# Patient Record
Sex: MALE | Race: WHITE | NOT HISPANIC OR LATINO | Employment: FULL TIME | URBAN - METROPOLITAN AREA
[De-identification: names, ages, dates, MRNs, and addresses within clinical notes are randomized per-mention and may not be internally consistent; named-entity substitution may affect disease eponyms.]

---

## 2017-03-13 ENCOUNTER — ALLSCRIPTS OFFICE VISIT (OUTPATIENT)
Dept: OTHER | Facility: OTHER | Age: 52
End: 2017-03-13

## 2017-03-13 LAB
BILIRUB UR QL STRIP: NEGATIVE
CLARITY UR: NORMAL
COLOR UR: YELLOW
GLUCOSE (HISTORICAL): >1000
HBA1C MFR BLD HPLC: 7.2 %
HGB UR QL STRIP.AUTO: NEGATIVE
KETONES UR STRIP-MCNC: NEGATIVE MG/DL
LEUKOCYTE ESTERASE UR QL STRIP: NEGATIVE
NITRITE UR QL STRIP: NEGATIVE
PH UR STRIP.AUTO: 6 [PH]
PROT UR STRIP-MCNC: NEGATIVE MG/DL
SP GR UR STRIP.AUTO: 1.01
UROBILINOGEN UR QL STRIP.AUTO: NORMAL

## 2017-06-12 ENCOUNTER — ALLSCRIPTS OFFICE VISIT (OUTPATIENT)
Dept: OTHER | Facility: OTHER | Age: 52
End: 2017-06-12

## 2017-06-12 LAB — HBA1C MFR BLD HPLC: 7.4 %

## 2017-07-11 ENCOUNTER — GENERIC CONVERSION - ENCOUNTER (OUTPATIENT)
Dept: OTHER | Facility: OTHER | Age: 52
End: 2017-07-11

## 2017-07-17 ENCOUNTER — GENERIC CONVERSION - ENCOUNTER (OUTPATIENT)
Dept: OTHER | Facility: OTHER | Age: 52
End: 2017-07-17

## 2017-09-11 ENCOUNTER — ALLSCRIPTS OFFICE VISIT (OUTPATIENT)
Dept: OTHER | Facility: OTHER | Age: 52
End: 2017-09-11

## 2017-09-11 LAB — HBA1C MFR BLD HPLC: 9.1 %

## 2017-10-03 ENCOUNTER — GENERIC CONVERSION - ENCOUNTER (OUTPATIENT)
Dept: OTHER | Facility: OTHER | Age: 52
End: 2017-10-03

## 2018-01-10 NOTE — RESULT NOTES
Message   PLEASE HOLD OV 12/12     Verified Results  (1) LIPID PANEL, FASTING [id number] 07:04AM [facility]     Test Name Result Flag Reference   CHOLESTEROL, TOTAL 162 mg/dL  125-200   HDL CHOLESTEROL 43 mg/dL  > OR = 40   TRIGLICERIDES 962 mg/dL H <150   LDL-CHOLESTEROL 79 mg/dL (calc)  <130   Desirable range <100 mg/dL for patients with CHD or  diabetes and <70 mg/dL for diabetic patients with  known heart disease  CHOL/HDLC RATIO 3 8 (calc)  < OR = 5 0   NON HDL CHOLESTEROL 119 mg/dL (calc)     Target for non-HDL cholesterol is 30 mg/dL higher than   LDL cholesterol target  (1) COMPREHENSIVE METABOLIC PANEL [id number] 13:44HA [facility]     Test Name Result Flag Reference   GLUCOSE 141 mg/dL H 65-99   Fasting reference interval   UREA NITROGEN (BUN) 16 mg/dL  7-25   CREATININE 0 94 mg/dL  0 70-1 33   For patients >52years of age, the reference limit  for Creatinine is approximately 13% higher for people  identified as -American  eGFR NON-AFR   AMERICAN 93 mL/min/1 73m2  > OR = 60   eGFR AFRICAN AMERICAN 108 mL/min/1 73m2  > OR = 60   BUN/CREATININE RATIO   5-80   NOT APPLICABLE (calc)   SODIUM 139 mmol/L  135-146   POTASSIUM 4 8 mmol/L  3 5-5 3   CHLORIDE 103 mmol/L     CARBON DIOXIDE 27 mmol/L  20-31   CALCIUM 9 5 mg/dL  8 6-10 3   PROTEIN, TOTAL 7 0 g/dL  6 1-8 1   ALBUMIN 4 7 g/dL  3 6-5 1   GLOBULIN 2 3 g/dL (calc)  1 9-3 7   ALBUMIN/GLOBULIN RATIO 2 0 (calc)  1 0-2 5   BILIRUBIN, TOTAL 0 6 mg/dL  0 2-1 2   ALKALINE PHOSPHATASE 76 U/L     AST 16 U/L  10-35   ALT 28 U/L  9-46     (1) URINALYSIS (will reflex a microscopy if leukocytes, occult blood, protein or nitrites are not within normal limits) [id number] 07:04AM [facility]     Test Name Result Flag Reference   COLOR YELLOW  YELLOW   APPEARANCE CLEAR  CLEAR   SPECIFIC GRAVITY 1 025  1 001-1 035   PH 6 5  5 0-8 0   GLUCOSE 3+ A NEGATIVE   BILIRUBIN NEGATIVE  NEGATIVE   KETONES NEGATIVE  NEGATIVE   OCCULT BLOOD NEGATIVE NEGATIVE   PROTEIN NEGATIVE  NEGATIVE   NITRITE NEGATIVE  NEGATIVE   LEUKOCYTE ESTERASE NEGATIVE  NEGATIVE   WBC 0-5 /HPF  < OR = 5   RBC NONE SEEN /HPF  < OR = 2   SQUAMOUS EPITHELIAL CELLS NONE SEEN /HPF  < OR = 5   BACTERIA NONE SEEN /HPF  NONE SEEN   HYALINE CAST NONE SEEN /LPF  NONE SEEN   COMMENTS      Spermatozoa present     (Q) HEMOGLOBIN A1c [id number] 07:04AM [facility]   REPORT COMMENT:  FASTING:YES     Test Name Result Flag Reference   HEMOGLOBIN A1c 7 9 % of total Hgb H <5 7   According to ADA guidelines, hemoglobin A1c <7 0%  represents optimal control in non-pregnant diabetic  patients  Different metrics may apply to specific  patient populations  Standards of Medical Care in    Diabetes Care  2013;36:s11-s66     For the purpose of screening for the presence of  diabetes  <5 7%       Consistent with the absence of diabetes  5 7-6 4%    Consistent with increased risk for diabetes              (prediabetes)  >or=6 5%    Consistent with diabetes     This assay result is consistent with diabetes  mellitus  Currently, no consensus exists for use of hemoglobin  A1c for diagnosis of diabetes for children  (Q) MICROALBUMIN, RANDOM URINE (W/CREATININE) [id number] 07:04AM [facility]     Test Name Result Flag Reference   CREATININE, RANDOM URINE 75 mg/dL     MICROALBUMIN 1 4 mg/dL     Reference Range  Not established   MICROALBUMIN/CREATININE$RATIO, RANDOM URINE 19 mcg/mg creat  <30   The ADA defines abnormalities in albumin  excretion as follows:     Category         Result (mcg/mg creatinine)     Normal                    <30  Microalbuminuria            Clinical albuminuria   > OR = 300     The ADA recommends that at least two of three  specimens collected within a 3-6 month period be  abnormal before considering a patient to be  within a diagnostic category

## 2018-01-12 VITALS
HEART RATE: 70 BPM | WEIGHT: 196 LBS | HEIGHT: 71 IN | SYSTOLIC BLOOD PRESSURE: 122 MMHG | BODY MASS INDEX: 27.44 KG/M2 | DIASTOLIC BLOOD PRESSURE: 78 MMHG | RESPIRATION RATE: 16 BRPM | TEMPERATURE: 97.7 F

## 2018-01-14 VITALS
WEIGHT: 200 LBS | OXYGEN SATURATION: 96 % | HEART RATE: 80 BPM | DIASTOLIC BLOOD PRESSURE: 70 MMHG | SYSTOLIC BLOOD PRESSURE: 118 MMHG | TEMPERATURE: 97.7 F | HEIGHT: 72 IN | BODY MASS INDEX: 27.09 KG/M2 | RESPIRATION RATE: 16 BRPM

## 2018-01-14 VITALS
HEART RATE: 88 BPM | WEIGHT: 190 LBS | RESPIRATION RATE: 16 BRPM | TEMPERATURE: 98.2 F | BODY MASS INDEX: 26.6 KG/M2 | DIASTOLIC BLOOD PRESSURE: 86 MMHG | SYSTOLIC BLOOD PRESSURE: 134 MMHG | HEIGHT: 71 IN

## 2018-01-16 NOTE — RESULT NOTES
Message   please hold on chart for ov next week   thanks     Verified Results  (1) COMPREHENSIVE METABOLIC PANEL [id number] 67:21BQ [facility]     Test Name Result Flag Reference   GLUCOSE 174 mg/dL H 65-99   Fasting reference interval   UREA NITROGEN (BUN) 19 mg/dL  7-25   CREATININE 1 00 mg/dL  0 70-1 33   For patients >52years of age, the reference limit  for Creatinine is approximately 13% higher for people  identified as -American  eGFR NON-AFR  AMERICAN 87 mL/min/1 73m2  > OR = 60   eGFR AFRICAN AMERICAN 101 mL/min/1 73m2  > OR = 60   BUN/CREATININE RATIO   6-52   NOT APPLICABLE (calc)   SODIUM 138 mmol/L  135-146   POTASSIUM 4 0 mmol/L  3 5-5 3   CHLORIDE 101 mmol/L     CARBON DIOXIDE 24 mmol/L  20-31   CALCIUM 9 8 mg/dL  8 6-10 3   PROTEIN, TOTAL 7 2 g/dL  6 1-8 1   ALBUMIN 4 7 g/dL  3 6-5 1   GLOBULIN 2 5 g/dL (calc)  1 9-3 7   ALBUMIN/GLOBULIN RATIO 1 9 (calc)  1 0-2 5   BILIRUBIN, TOTAL 0 5 mg/dL  0 2-1 2   ALKALINE PHOSPHATASE 73 U/L     AST 19 U/L  10-35   ALT 32 U/L  9-46     (1) LIPID PANEL, FASTING [id number] 07:01AM [facility]     Test Name Result Flag Reference   CHOLESTEROL, TOTAL 166 mg/dL  125-200   HDL CHOLESTEROL 40 mg/dL  > OR = 40   TRIGLICERIDES 873 mg/dL H <150   LDL-CHOLESTEROL 67 mg/dL (calc)  <130   Desirable range <100 mg/dL for patients with CHD or  diabetes and <70 mg/dL for diabetic patients with  known heart disease  CHOL/HDLC RATIO 4 2 (calc)  < OR = 5 0   NON HDL CHOLESTEROL 126 mg/dL (calc)     Target for non-HDL cholesterol is 30 mg/dL higher than   LDL cholesterol target  (Q) HEMOGLOBIN A1c [id number] 07:01AM [facility]   REPORT COMMENT:  FASTING:YES     Test Name Result Flag Reference   HEMOGLOBIN A1c 8 0 % of total Hgb H <5 7   According to ADA guidelines, hemoglobin A1c <7 0%  represents optimal control in non-pregnant diabetic  patients  Different metrics may apply to specific  patient populations   Standards of Medical Care in    Diabetes Care  2013;36:s11-s66     For the purpose of screening for the presence of  diabetes  <5 7%       Consistent with the absence of diabetes  5 7-6 4%    Consistent with increased risk for diabetes              (prediabetes)  >or=6 5%    Consistent with diabetes     This assay result is consistent with diabetes  mellitus  Currently, no consensus exists for use of hemoglobin  A1c for diagnosis of diabetes for children

## 2018-07-17 LAB
LEFT EYE DIABETIC RETINOPATHY: NORMAL
RIGHT EYE DIABETIC RETINOPATHY: NORMAL

## 2018-07-17 PROCEDURE — 3072F LOW RISK FOR RETINOPATHY: CPT | Performed by: FAMILY MEDICINE

## 2018-08-02 ENCOUNTER — OFFICE VISIT (OUTPATIENT)
Dept: FAMILY MEDICINE CLINIC | Facility: CLINIC | Age: 53
End: 2018-08-02
Payer: COMMERCIAL

## 2018-08-02 VITALS
BODY MASS INDEX: 27.36 KG/M2 | HEART RATE: 56 BPM | TEMPERATURE: 97.5 F | HEIGHT: 72 IN | SYSTOLIC BLOOD PRESSURE: 130 MMHG | RESPIRATION RATE: 16 BRPM | DIASTOLIC BLOOD PRESSURE: 80 MMHG | OXYGEN SATURATION: 96 % | WEIGHT: 202 LBS

## 2018-08-02 DIAGNOSIS — H60.501 ACUTE OTITIS EXTERNA OF RIGHT EAR, UNSPECIFIED TYPE: ICD-10-CM

## 2018-08-02 DIAGNOSIS — H61.21 IMPACTED CERUMEN OF RIGHT EAR: ICD-10-CM

## 2018-08-02 DIAGNOSIS — J01.40 ACUTE NON-RECURRENT PANSINUSITIS: Primary | ICD-10-CM

## 2018-08-02 PROBLEM — E11.9 TYPE 2 DIABETES MELLITUS WITHOUT COMPLICATION (HCC): Status: ACTIVE | Noted: 2018-08-02

## 2018-08-02 PROBLEM — H90.3 BILATERAL SENSORINEURAL HEARING LOSS: Status: ACTIVE | Noted: 2018-08-02

## 2018-08-02 PROBLEM — G43.909 MIGRAINE: Status: ACTIVE | Noted: 2018-08-02

## 2018-08-02 PROCEDURE — 99213 OFFICE O/P EST LOW 20 MIN: CPT | Performed by: FAMILY MEDICINE

## 2018-08-02 PROCEDURE — 3008F BODY MASS INDEX DOCD: CPT | Performed by: FAMILY MEDICINE

## 2018-08-02 PROCEDURE — 69209 REMOVE IMPACTED EAR WAX UNI: CPT | Performed by: FAMILY MEDICINE

## 2018-08-02 PROCEDURE — 1036F TOBACCO NON-USER: CPT | Performed by: FAMILY MEDICINE

## 2018-08-02 RX ORDER — TADALAFIL 20 MG/1
1 TABLET ORAL
COMMUNITY
Start: 2015-12-07 | End: 2020-03-05 | Stop reason: ALTCHOICE

## 2018-08-02 RX ORDER — RIZATRIPTAN BENZOATE 10 MG/1
TABLET, ORALLY DISINTEGRATING ORAL
COMMUNITY
End: 2020-03-05 | Stop reason: ALTCHOICE

## 2018-08-02 RX ORDER — NEOMYCIN SULFATE, POLYMYXIN B SULFATE AND HYDROCORTISONE 10; 3.5; 1 MG/ML; MG/ML; [USP'U]/ML
4 SUSPENSION/ DROPS AURICULAR (OTIC) 4 TIMES DAILY
Qty: 4 ML | Refills: 0 | Status: SHIPPED | OUTPATIENT
Start: 2018-08-02 | End: 2019-06-26

## 2018-08-02 RX ORDER — VENLAFAXINE 75 MG/1
1 TABLET ORAL DAILY
COMMUNITY
End: 2018-08-02 | Stop reason: ALTCHOICE

## 2018-08-02 RX ORDER — AMOXICILLIN AND CLAVULANATE POTASSIUM 500; 125 MG/1; MG/1
1 TABLET, FILM COATED ORAL EVERY 12 HOURS SCHEDULED
Qty: 20 TABLET | Refills: 0 | Status: SHIPPED | OUTPATIENT
Start: 2018-08-02 | End: 2018-08-12

## 2018-08-02 RX ORDER — AMITRIPTYLINE HYDROCHLORIDE 50 MG/1
TABLET, FILM COATED ORAL
COMMUNITY
End: 2018-08-02 | Stop reason: ALTCHOICE

## 2018-08-02 RX ORDER — ATORVASTATIN CALCIUM 10 MG/1
10 TABLET, FILM COATED ORAL DAILY
COMMUNITY

## 2018-08-02 RX ORDER — PROPRANOLOL HCL 60 MG
CAPSULE, EXTENDED RELEASE 24HR ORAL DAILY
COMMUNITY
End: 2020-03-05 | Stop reason: ALTCHOICE

## 2018-08-02 RX ORDER — FLUCONAZOLE 150 MG/1
TABLET ORAL
COMMUNITY
End: 2018-08-02 | Stop reason: ALTCHOICE

## 2018-08-02 RX ORDER — AMITRIPTYLINE HYDROCHLORIDE 25 MG/1
TABLET, FILM COATED ORAL DAILY
COMMUNITY
End: 2020-03-05 | Stop reason: ALTCHOICE

## 2018-08-02 NOTE — PROGRESS NOTES
Assessment/Plan:    Problem List Items Addressed This Visit        Respiratory    Acute non-recurrent pansinusitis - Primary    Relevant Medications    amoxicillin-clavulanate (AUGMENTIN) 500-125 mg per tablet       Nervous and Auditory    Impacted cerumen of right ear    Acute otitis externa of right ear    Relevant Medications    neomycin-polymyxin-hydrocortisone (CORTISPORIN) 0 35%-10,000 units/mL-1% otic suspension          Patient Instructions   PLENTY FLUIDS  REST  MUCINEX  MEDICATION AS DIRECTED  IF SYMPTOMS PERSIST OR WORSEN, PLEASE CALL      FLONASE  2 SPRAYS EACH NOSTRIL DAILY  CERUMENEX OR DEBROX  1 DROPPER FULL EACH EAR WEEKLY BEFORE SHOWER  KEEP EARS DRY        Return in about 2 weeks (around 8/16/2018), or if symptoms worsen or fail to improve  Subjective:      Patient ID: Kianna Brownlee is a 48 y o  male  Chief Complaint   Patient presents with    right ear pain     x 1 month       PATIENT COMPLAINS OF CONGESTION  R EAR FEELS CLOGGED AND HURTS  X SEVERAL WEEKS  DENIES ANY FEVER OR CHILLS  NO NVD  NO COUGH        The following portions of the patient's history were reviewed and updated as appropriate: allergies, current medications, past family history, past medical history, past social history, past surgical history and problem list     Review of Systems   Constitutional: Negative for chills  HENT: Positive for congestion, ear pain, sinus pressure and sore throat  Negative for sneezing  Eyes: Negative for discharge  Respiratory: Negative for cough and shortness of breath  Cardiovascular: Negative for chest pain  Gastrointestinal: Negative for abdominal pain, diarrhea, nausea and vomiting  Genitourinary: Negative for dysuria  Musculoskeletal: Negative for arthralgias, myalgias and neck pain  Neurological: Positive for headaches  Hematological: Negative for adenopathy  Psychiatric/Behavioral: The patient is not nervous/anxious            Current Outpatient Prescriptions Medication Sig Dispense Refill    amitriptyline (ELAVIL) 25 mg tablet Daily      atorvastatin (LIPITOR) 10 mg tablet atorvastatin 10 mg tablet      Empagliflozin-Metformin HCl (SYNJARDY) 12 5-1000 MG TABS Synjardy 12 5 mg-1,000 mg tablet      Exenatide ER (BYDUREON BCISE) 2 MG/0 85ML AUIJ Bydureon BCise 2 mg/0 85 mL subcutaneous auto-injector      glucose blood (ONETOUCH VERIO) test strip by In Vitro route      propranolol (INDERAL LA) 60 mg 24 hr capsule Daily      rizatriptan (MAXALT-MLT) 10 MG disintegrating tablet rizatriptan 10 mg disintegrating tablet      tadalafil (CIALIS) 20 MG tablet Take 1 tablet by mouth      amoxicillin-clavulanate (AUGMENTIN) 500-125 mg per tablet Take 1 tablet by mouth every 12 (twelve) hours for 10 days 20 tablet 0    neomycin-polymyxin-hydrocortisone (CORTISPORIN) 0 35%-10,000 units/mL-1% otic suspension Administer 4 drops to the right ear 4 (four) times a day for 5 days 4 mL 0     No current facility-administered medications for this visit  Objective:    /80   Pulse 56   Temp 97 5 °F (36 4 °C) (Temporal)   Resp 16   Ht 6' (1 829 m)   Wt 91 6 kg (202 lb)   SpO2 96%   BMI 27 40 kg/m²        Physical Exam   Constitutional: He is oriented to person, place, and time  He appears well-developed and well-nourished  HENT:   Head: Normocephalic and atraumatic  Right Ear: No drainage  Tympanic membrane is not erythematous and not bulging  A middle ear effusion is present  Left Ear: No drainage  Tympanic membrane is not erythematous and not bulging  A middle ear effusion is present  Nose: Mucosal edema and rhinorrhea present  Mouth/Throat: Uvula is midline  Posterior oropharyngeal erythema present  No oropharyngeal exudate  R EAR CANAL OCCLUDED WITH CERUMEN  IRRIGATED TILL CLEAR  TM DULL  R CANAL MILDLY ERYTHEMATOUS   Eyes: Pupils are equal, round, and reactive to light  Right eye exhibits no discharge  Left eye exhibits no discharge     Neck: Normal range of motion  Neck supple  Cardiovascular: Normal rate, regular rhythm and normal heart sounds  No murmur heard  Pulmonary/Chest: Effort normal and breath sounds normal  He has no wheezes  He has no rales  Abdominal: Soft  Bowel sounds are normal  There is no tenderness  Musculoskeletal: Normal range of motion  He exhibits no edema  Lymphadenopathy:     He has no cervical adenopathy  Neurological: He is alert and oriented to person, place, and time  Skin: Skin is warm and dry  No rash noted  Psychiatric: He has a normal mood and affect  His behavior is normal  Judgment and thought content normal    Nursing note and vitals reviewed               Cary Atkins MD

## 2018-08-02 NOTE — PROGRESS NOTES
Ear cerumen removal  Date/Time: 8/2/2018 1:53 PM  Performed by: Melody Navarro by: Martha Braun     Patient location:  Clinic  Other Assisting Provider: No    Consent:     Consent obtained:  Verbal    Consent given by:  Patient    Risks discussed:  Bleeding, dizziness, infection, incomplete removal, TM perforation and pain    Alternatives discussed:  No treatment  Procedure details:     Location:  R ear    Procedure type: irrigation      Approach:  External  Post-procedure details:     Complication:  None    Hearing quality:  Improved    Patient tolerance of procedure:   Tolerated well, no immediate complications

## 2018-08-02 NOTE — PATIENT INSTRUCTIONS
PLENTY FLUIDS  REST  MUCINEX  MEDICATION AS DIRECTED  IF SYMPTOMS PERSIST OR WORSEN, PLEASE CALL      FLONASE  2 SPRAYS EACH NOSTRIL DAILY  CERUMENEX OR DEBROX  1 DROPPER FULL EACH EAR WEEKLY BEFORE SHOWER  KEEP EARS DRY

## 2018-08-03 ENCOUNTER — TELEPHONE (OUTPATIENT)
Dept: FAMILY MEDICINE CLINIC | Facility: CLINIC | Age: 53
End: 2018-08-03

## 2018-08-03 DIAGNOSIS — J01.40 ACUTE NON-RECURRENT PANSINUSITIS: Primary | ICD-10-CM

## 2018-08-03 RX ORDER — CIPROFLOXACIN 250 MG/1
250 TABLET, FILM COATED ORAL EVERY 12 HOURS SCHEDULED
Qty: 20 TABLET | Refills: 0 | Status: SHIPPED | OUTPATIENT
Start: 2018-08-03 | End: 2018-08-13

## 2019-05-08 LAB
CREAT ?TM UR-SCNC: 30 UMOL/L
EXT MICROALBUMIN URINE RANDOM: 0.4
HBA1C MFR BLD HPLC: 8.1 %
MICROALBUMIN/CREAT UR: 15 MG/G{CREAT}

## 2019-05-08 PROCEDURE — 3061F NEG MICROALBUMINURIA REV: CPT | Performed by: FAMILY MEDICINE

## 2019-06-26 ENCOUNTER — OFFICE VISIT (OUTPATIENT)
Dept: FAMILY MEDICINE CLINIC | Facility: CLINIC | Age: 54
End: 2019-06-26
Payer: COMMERCIAL

## 2019-06-26 VITALS
OXYGEN SATURATION: 98 % | BODY MASS INDEX: 27.77 KG/M2 | HEIGHT: 72 IN | HEART RATE: 89 BPM | RESPIRATION RATE: 14 BRPM | WEIGHT: 205 LBS | DIASTOLIC BLOOD PRESSURE: 90 MMHG | TEMPERATURE: 97.8 F | SYSTOLIC BLOOD PRESSURE: 140 MMHG

## 2019-06-26 DIAGNOSIS — R51.9 CHRONIC NONINTRACTABLE HEADACHE, UNSPECIFIED HEADACHE TYPE: ICD-10-CM

## 2019-06-26 DIAGNOSIS — G89.29 CHRONIC NONINTRACTABLE HEADACHE, UNSPECIFIED HEADACHE TYPE: ICD-10-CM

## 2019-06-26 DIAGNOSIS — M48.02 SPINAL STENOSIS IN CERVICAL REGION: ICD-10-CM

## 2019-06-26 DIAGNOSIS — H92.01 RIGHT EAR PAIN: ICD-10-CM

## 2019-06-26 DIAGNOSIS — G62.9 NEUROPATHY: Primary | ICD-10-CM

## 2019-06-26 DIAGNOSIS — E11.42 TYPE 2 DIABETES MELLITUS WITH DIABETIC POLYNEUROPATHY, WITHOUT LONG-TERM CURRENT USE OF INSULIN (HCC): ICD-10-CM

## 2019-06-26 DIAGNOSIS — R53.83 FATIGUE, UNSPECIFIED TYPE: ICD-10-CM

## 2019-06-26 PROBLEM — H61.21 IMPACTED CERUMEN OF RIGHT EAR: Status: RESOLVED | Noted: 2018-08-02 | Resolved: 2019-06-26

## 2019-06-26 PROBLEM — H60.501 ACUTE OTITIS EXTERNA OF RIGHT EAR: Status: RESOLVED | Noted: 2018-08-02 | Resolved: 2019-06-26

## 2019-06-26 PROBLEM — J01.40 ACUTE NON-RECURRENT PANSINUSITIS: Status: RESOLVED | Noted: 2018-08-02 | Resolved: 2019-06-26

## 2019-06-26 LAB — SL AMB POCT HEMOGLOBIN AIC: 7.9 (ref ?–6.5)

## 2019-06-26 PROCEDURE — 3008F BODY MASS INDEX DOCD: CPT | Performed by: FAMILY MEDICINE

## 2019-06-26 PROCEDURE — 36415 COLL VENOUS BLD VENIPUNCTURE: CPT | Performed by: FAMILY MEDICINE

## 2019-06-26 PROCEDURE — 1036F TOBACCO NON-USER: CPT | Performed by: FAMILY MEDICINE

## 2019-06-26 PROCEDURE — 83036 HEMOGLOBIN GLYCOSYLATED A1C: CPT | Performed by: FAMILY MEDICINE

## 2019-06-26 PROCEDURE — 3045F PR MOST RECENT HEMOGLOBIN A1C LEVEL 7.0-9.0%: CPT | Performed by: FAMILY MEDICINE

## 2019-06-26 PROCEDURE — 99214 OFFICE O/P EST MOD 30 MIN: CPT | Performed by: FAMILY MEDICINE

## 2019-06-27 ENCOUNTER — TELEPHONE (OUTPATIENT)
Dept: FAMILY MEDICINE CLINIC | Facility: CLINIC | Age: 54
End: 2019-06-27

## 2019-06-27 LAB
ALBUMIN SERPL-MCNC: 4.9 G/DL (ref 3.5–5.5)
ALBUMIN/CREAT UR: <11.5 MG/G CREAT (ref 0–30)
ALBUMIN/GLOB SERPL: 1.8 {RATIO} (ref 1.2–2.2)
ALP SERPL-CCNC: 72 IU/L (ref 39–117)
ALT SERPL-CCNC: 30 IU/L (ref 0–44)
AST SERPL-CCNC: 19 IU/L (ref 0–40)
B BURGDOR IGG PATRN SER IB-IMP: NEGATIVE
B BURGDOR IGM PATRN SER IB-IMP: NEGATIVE
B BURGDOR18KD IGG SER QL IB: PRESENT
B BURGDOR23KD IGG SER QL IB: ABNORMAL
B BURGDOR23KD IGM SER QL IB: ABNORMAL
B BURGDOR28KD IGG SER QL IB: PRESENT
B BURGDOR30KD IGG SER QL IB: ABNORMAL
B BURGDOR39KD IGG SER QL IB: ABNORMAL
B BURGDOR39KD IGM SER QL IB: ABNORMAL
B BURGDOR41KD IGG SER QL IB: ABNORMAL
B BURGDOR41KD IGM SER QL IB: ABNORMAL
B BURGDOR45KD IGG SER QL IB: ABNORMAL
B BURGDOR58KD IGG SER QL IB: ABNORMAL
B BURGDOR66KD IGG SER QL IB: ABNORMAL
B BURGDOR93KD IGG SER QL IB: ABNORMAL
BASOPHILS # BLD AUTO: 0.1 X10E3/UL (ref 0–0.2)
BASOPHILS NFR BLD AUTO: 1 %
BILIRUB SERPL-MCNC: 0.5 MG/DL (ref 0–1.2)
BUN SERPL-MCNC: 14 MG/DL (ref 6–24)
BUN/CREAT SERPL: 14 (ref 9–20)
CALCIUM SERPL-MCNC: 10.4 MG/DL (ref 8.7–10.2)
CENTROMERE B AB SER-ACNC: <0.2 AI (ref 0–0.9)
CHLORIDE SERPL-SCNC: 102 MMOL/L (ref 96–106)
CHOLEST SERPL-MCNC: 194 MG/DL (ref 100–199)
CHOLEST/HDLC SERPL: 4.4 RATIO (ref 0–5)
CHROMATIN AB SERPL-ACNC: <0.2 AI (ref 0–0.9)
CO2 SERPL-SCNC: 22 MMOL/L (ref 20–29)
CREAT SERPL-MCNC: 0.99 MG/DL (ref 0.76–1.27)
CREAT UR-MCNC: 26.1 MG/DL
CRP SERPL-MCNC: <1 MG/L (ref 0–10)
DSDNA AB SER-ACNC: <1 IU/ML (ref 0–9)
ENA JO1 AB SER-ACNC: <0.2 AI (ref 0–0.9)
ENA RNP AB SER-ACNC: <0.2 AI (ref 0–0.9)
ENA SCL70 AB SER-ACNC: <0.2 AI (ref 0–0.9)
ENA SM AB SER-ACNC: <0.2 AI (ref 0–0.9)
ENA SS-A AB SER-ACNC: <0.2 AI (ref 0–0.9)
ENA SS-B AB SER-ACNC: <0.2 AI (ref 0–0.9)
EOSINOPHIL # BLD AUTO: 0.2 X10E3/UL (ref 0–0.4)
EOSINOPHIL NFR BLD AUTO: 2 %
ERYTHROCYTE [DISTWIDTH] IN BLOOD BY AUTOMATED COUNT: 13.6 % (ref 12.3–15.4)
ERYTHROCYTE [SEDIMENTATION RATE] IN BLOOD BY WESTERGREN METHOD: 21 MM/HR (ref 0–30)
EST. AVERAGE GLUCOSE BLD GHB EST-MCNC: 200 MG/DL
GLOBULIN SER-MCNC: 2.7 G/DL (ref 1.5–4.5)
GLUCOSE SERPL-MCNC: 186 MG/DL (ref 65–99)
HBA1C MFR BLD: 8.6 % (ref 4.8–5.6)
HCT VFR BLD AUTO: 50.1 % (ref 37.5–51)
HDLC SERPL-MCNC: 44 MG/DL
HGB BLD-MCNC: 16.9 G/DL (ref 13–17.7)
IMM GRANULOCYTES # BLD: 0 X10E3/UL (ref 0–0.1)
IMM GRANULOCYTES NFR BLD: 0 %
LDLC SERPL CALC-MCNC: 101 MG/DL (ref 0–99)
LYMPHOCYTES # BLD AUTO: 2.8 X10E3/UL (ref 0.7–3.1)
LYMPHOCYTES NFR BLD AUTO: 37 %
MCH RBC QN AUTO: 30.4 PG (ref 26.6–33)
MCHC RBC AUTO-ENTMCNC: 33.7 G/DL (ref 31.5–35.7)
MCV RBC AUTO: 90 FL (ref 79–97)
MICROALBUMIN UR-MCNC: <3 UG/ML
MONOCYTES # BLD AUTO: 0.5 X10E3/UL (ref 0.1–0.9)
MONOCYTES NFR BLD AUTO: 7 %
NEUTROPHILS # BLD AUTO: 3.9 X10E3/UL (ref 1.4–7)
NEUTROPHILS NFR BLD AUTO: 53 %
PLATELET # BLD AUTO: 227 X10E3/UL (ref 150–450)
POTASSIUM SERPL-SCNC: 4.6 MMOL/L (ref 3.5–5.2)
PROT SERPL-MCNC: 7.6 G/DL (ref 6–8.5)
RBC # BLD AUTO: 5.56 X10E6/UL (ref 4.14–5.8)
SL AMB EGFR AFRICAN AMERICAN: 99 ML/MIN/1.73
SL AMB EGFR NON AFRICAN AMERICAN: 86 ML/MIN/1.73
SL AMB SEE BELOW:: NORMAL
SL AMB VLDL CHOLESTEROL CALC: 49 MG/DL (ref 5–40)
SODIUM SERPL-SCNC: 140 MMOL/L (ref 134–144)
TRIGL SERPL-MCNC: 247 MG/DL (ref 0–149)
TSH SERPL DL<=0.005 MIU/L-ACNC: 1.93 UIU/ML (ref 0.45–4.5)
WBC # BLD AUTO: 7.4 X10E3/UL (ref 3.4–10.8)

## 2019-06-27 PROCEDURE — 3045F PR MOST RECENT HEMOGLOBIN A1C LEVEL 7.0-9.0%: CPT | Performed by: FAMILY MEDICINE

## 2019-06-28 ENCOUNTER — TELEPHONE (OUTPATIENT)
Dept: FAMILY MEDICINE CLINIC | Facility: CLINIC | Age: 54
End: 2019-06-28

## 2019-07-10 NOTE — TELEPHONE ENCOUNTER
Jilda Nageotte called an stated that he received a phone call from the scheduling department that his MRI was approved and scheduled his appointment  I informed Dr Casimiro Ferreira that it was denied  He wanted Levi Bonilla to call Jilda Nageotte to let him know and that if he wanted to try to see a neurologist and have them order it he can do the referral     I verified again with the insurance company and it was denied    Spoke to Jilda Nageotte and he will let us know about seeing a neurologist

## 2019-09-05 DIAGNOSIS — B37.0 THRUSH: Primary | ICD-10-CM

## 2020-02-26 LAB
CREAT ?TM UR-SCNC: 29 UMOL/L
CREAT ?TM UR-SCNC: 29 UMOL/L
EXT MICROALBUMIN URINE RANDOM: 0.3
EXT MICROALBUMIN URINE RANDOM: 0.3
HBA1C MFR BLD HPLC: 7.5 %
MICROALBUMIN/CREAT UR: 10 MG/G{CREAT}
MICROALBUMIN/CREAT UR: 10 MG/G{CREAT}

## 2020-02-26 PROCEDURE — 3051F HG A1C>EQUAL 7.0%<8.0%: CPT | Performed by: FAMILY MEDICINE

## 2020-03-05 ENCOUNTER — OFFICE VISIT (OUTPATIENT)
Dept: FAMILY MEDICINE CLINIC | Facility: CLINIC | Age: 55
End: 2020-03-05
Payer: COMMERCIAL

## 2020-03-05 VITALS
SYSTOLIC BLOOD PRESSURE: 136 MMHG | TEMPERATURE: 98.2 F | DIASTOLIC BLOOD PRESSURE: 90 MMHG | HEIGHT: 72 IN | RESPIRATION RATE: 16 BRPM | WEIGHT: 206 LBS | HEART RATE: 90 BPM | OXYGEN SATURATION: 96 % | BODY MASS INDEX: 27.9 KG/M2

## 2020-03-05 DIAGNOSIS — E11.42 TYPE 2 DIABETES MELLITUS WITH DIABETIC POLYNEUROPATHY, WITHOUT LONG-TERM CURRENT USE OF INSULIN (HCC): ICD-10-CM

## 2020-03-05 DIAGNOSIS — H57.89 EYE SWELLING, RIGHT: Primary | ICD-10-CM

## 2020-03-05 DIAGNOSIS — H01.001 BLEPHARITIS OF RIGHT UPPER EYELID, UNSPECIFIED TYPE: ICD-10-CM

## 2020-03-05 PROBLEM — H92.01 RIGHT EAR PAIN: Status: RESOLVED | Noted: 2019-06-26 | Resolved: 2020-03-05

## 2020-03-05 PROBLEM — R53.83 FATIGUE: Status: RESOLVED | Noted: 2019-06-26 | Resolved: 2020-03-05

## 2020-03-05 PROCEDURE — 1036F TOBACCO NON-USER: CPT | Performed by: FAMILY MEDICINE

## 2020-03-05 PROCEDURE — 99213 OFFICE O/P EST LOW 20 MIN: CPT | Performed by: FAMILY MEDICINE

## 2020-03-05 PROCEDURE — 3008F BODY MASS INDEX DOCD: CPT | Performed by: FAMILY MEDICINE

## 2020-03-05 RX ORDER — IPRATROPIUM BROMIDE 42 UG/1
SPRAY, METERED NASAL
COMMUNITY
Start: 2020-02-26 | End: 2021-05-12 | Stop reason: HOSPADM

## 2020-03-05 RX ORDER — TOBRAMYCIN AND DEXAMETHASONE 3; 1 MG/ML; MG/ML
2 SUSPENSION/ DROPS OPHTHALMIC 4 TIMES DAILY
Qty: 2.5 ML | Refills: 0 | Status: SHIPPED | OUTPATIENT
Start: 2020-03-05 | End: 2020-03-10

## 2020-03-05 NOTE — PROGRESS NOTES
BMI Counseling: Body mass index is 27 94 kg/m²  The BMI is above normal  Nutrition recommendations include encouraging healthy choices of fruits and vegetables and moderation in carbohydrate intake  Exercise recommendations include exercising 3-5 times per week  No pharmacotherapy was ordered  Assessment/Plan:    No problem-specific Assessment & Plan notes found for this encounter  Diagnoses and all orders for this visit:    Eye swelling, right  -     tobramycin-dexamethasone (TOBRADEX) ophthalmic suspension; Administer 2 drops to both eyes 4 (four) times a day for 5 days    Type 2 diabetes mellitus with diabetic polyneuropathy, without long-term current use of insulin (HCC)  -     tobramycin-dexamethasone (TOBRADEX) ophthalmic suspension; Administer 2 drops to both eyes 4 (four) times a day for 5 days    Blepharitis of right upper eyelid, unspecified type  -     tobramycin-dexamethasone (TOBRADEX) ophthalmic suspension; Administer 2 drops to both eyes 4 (four) times a day for 5 days    Other orders  -     Cancel: POCT hemoglobin A1c  -     Cancel: Microalbumin,Urine  -     ipratropium (ATROVENT) 0 06 % nasal spray; inhale 2 spray by Intranasal route 2 times every day in each nostril  -     insulin glargine (Toujeo Max SoloStar) 300 units/mL CONCETRATED U-300 injection pen (2-unit dial); Inject 60 Units under the skin daily          Patient Instructions   COOL COMPRESSES  MEDICATION AS DIRECTED    RV IF SYMPTOMS PERSIST OR WORSEN      Return if symptoms worsen or fail to improve, for Next scheduled follow up  Subjective:      Patient ID: Eva Brooks is a 54 y o  male      Chief Complaint   Patient presents with    Conjunctivitis     swelling Rt eye       HPI    The following portions of the patient's history were reviewed and updated as appropriate: allergies, current medications, past family history, past medical history, past social history, past surgical history and problem list     Review of Systems   Constitutional: Negative for chills, fatigue and fever  HENT: Negative for sore throat  Eyes: Negative for discharge  Respiratory: Negative for cough and chest tightness  Cardiovascular: Negative for chest pain and palpitations  Gastrointestinal: Negative for abdominal pain, diarrhea, nausea and vomiting  Musculoskeletal: Negative for arthralgias and gait problem  Neurological: Negative for dizziness, weakness and headaches  Hematological: Negative for adenopathy  Psychiatric/Behavioral: The patient is not nervous/anxious  Current Outpatient Medications   Medication Sig Dispense Refill    atorvastatin (LIPITOR) 10 mg tablet atorvastatin 10 mg tablet      Empagliflozin-Metformin HCl (SYNJARDY) 12 5-1000 MG TABS Synjardy 12 5 mg-1,000 mg tablet      glucose blood (ONETOUCH VERIO) test strip by In Vitro route      insulin glargine (Toujeo Max SoloStar) 300 units/mL CONCETRATED U-300 injection pen (2-unit dial) Inject 60 Units under the skin daily      ipratropium (ATROVENT) 0 06 % nasal spray inhale 2 spray by Intranasal route 2 times every day in each nostril      tobramycin-dexamethasone (TOBRADEX) ophthalmic suspension Administer 2 drops to both eyes 4 (four) times a day for 5 days 2 5 mL 0     No current facility-administered medications for this visit  Objective:    /90   Pulse 90   Temp 98 2 °F (36 8 °C) (Temporal)   Resp 16   Ht 6' (1 829 m)   Wt 93 4 kg (206 lb)   SpO2 96%   BMI 27 94 kg/m²        Physical Exam   Constitutional: He is oriented to person, place, and time  He appears well-developed and well-nourished  HENT:   Head: Normocephalic and atraumatic  Eyes: Pupils are equal, round, and reactive to light  EOM are normal  Right eye exhibits no discharge  Left eye exhibits no discharge  CONJUNCTIVA RED BILAT  SEROUS DRAINAGE  R UPPER LID SWOLLEN, MILDLY RED   Neck: Neck supple  No JVD present  No thyromegaly present     Cardiovascular: Normal rate, regular rhythm and normal heart sounds  No murmur heard  Pulmonary/Chest: Effort normal and breath sounds normal  He has no wheezes  He has no rales  Abdominal: Soft  Bowel sounds are normal  He exhibits no mass  There is no hepatosplenomegaly  There is no tenderness  There is no rebound, no guarding and no CVA tenderness  Musculoskeletal: Normal range of motion  He exhibits no edema, tenderness or deformity  Lymphadenopathy:     He has no cervical adenopathy  He has no axillary adenopathy  Neurological: He is alert and oriented to person, place, and time  Skin: Skin is warm and dry  No rash noted  No erythema  Psychiatric: He has a normal mood and affect   His behavior is normal  Judgment and thought content normal               Santina Harada, MD

## 2020-03-06 ENCOUNTER — TELEPHONE (OUTPATIENT)
Dept: ADMINISTRATIVE | Facility: OTHER | Age: 55
End: 2020-03-06

## 2020-03-06 NOTE — LETTER
Diabetic Foot Exam Form    Date Requested: 20  Patient: Enrique Portillo  Patient : 1965   Referring Provider: Anum Mac MD    Diabetic Foot Exam Performed with shoes and socks removed        Yes         No     Date of Diabetic Foot Exam ______________________________  Risk Score ____________________________________________    Left Foot       Visual Inspection         Monofilament Testing Sensory Exam        Pedal Pulses         Additional Comments         Right Foot      Visual Inspection         Monofilament Testing Sensory Exam       Pedal Pulses         Additional Comments         Comments __________________________________________________________    Practice Providing Exam ______________________________________________    Exam Performed By (print name) _______________________________________      Provider Signature ___________________________________________________      These reports are needed for  compliance  Please fax this completed form and a copy of the Diabetic Foot Exam report to our office located at [place] [address] as soon as possible to [phone] attention Amanda: Phone [phone]    We thank you for your assistance in treating our mutual patient     (sent to [facility] Dr Praveena Patel MD)

## 2020-03-06 NOTE — TELEPHONE ENCOUNTER
Upon review of the In Basket request and the patient's chart, initial outreach has been made via fax, please see Contacts section for details  A second outreach attempt will be made within 3 business days      Thank you  Laura Hein MA

## 2020-03-06 NOTE — TELEPHONE ENCOUNTER
----- Message from Conception Rumpf, [place] sent at 3/5/2020  3:13 PM EST -----  Regarding: DM Foot, Hemo A1C, Urine Micro - [facility] Phys  Contact: [phone]  03/05/20 3:14 PM    Hello, our patient Enrique Portillo has had Diabetic Foot Exam, Hemoglobin A1c and Urine Microalbumin completed/performed  Please assist in updating the patient chart by making an External outreach to Dr Virgil Farfan, Endocrinologist facility located in [place]   The date of service is 2020    Thank you,  Ozzy Rutledge, [address] [place] [address]

## 2020-03-06 NOTE — LETTER
Lab Result(s) Request Form: Hemoglobin A1c and Urine Microalbumin      Date Requested: 20  Patient: Phil Beltrán  Patient : 1965   Referring Provider: Sultana Monteiro MD        Date of Lab Collection ______________________________       The above patient has informed us that they have completed their   most recent Hemoglobin A1c and Urine Microalbumin at your facility  Please complete   this form and attach all corresponding procedure reports/results  Comments __________________________________________________________  ____________________________________________________________________  ____________________________________________________________________  ____________________________________________________________________    Collecting/Resulting Facility  ___________________________________________  Form Completed By (print name) ________________________________________    Signature ___________________________________________________________      These reports are needed for  compliance  Please fax this completed form and a copy of the lab results/report to our office located at [place] [address] as soon as possible to [phone] attention Amanda: Phone [phone]    We thank you for your assistance in treating our mutual patient       (sent to [facility] Dr Ira Mccallum MD)

## 2020-03-11 NOTE — TELEPHONE ENCOUNTER
Upon review of your request/inquiry, we were advised from Bandar Copeland at the [facility] that the patient did not have a diabetic foot exam on office visits 3/2/2020, 11/29/2019, and 8/28/2019  She stated she will fax over the most recent A1C & Microalbumin lab reports  Any additional questions or concerns should be emailed to the Practice Liaisons via [email or web address] email, please do not reply via In Basket       Thank you  Paola Matos MA

## 2020-03-11 NOTE — TELEPHONE ENCOUNTER
As a follow-up, a second attempt has been made for outreach via call, please see Contacts section for details  A third and final attempt will be made within 3 business days      Thank you  Neda Ordoñez MA

## 2020-03-12 NOTE — TELEPHONE ENCOUNTER
Upon review of the In Basket request we were able to locate, review, and update the patient chart as requested for Hemoglobin A1c and Urine Microalbumin  Please see previous note regarding Diabetic Foot Exam      Any additional questions or concerns should be emailed to the Practice Liaisons via [email or web address] email, please do not reply via In Basket      Thank you  Paola Matos MA

## 2021-03-10 DIAGNOSIS — Z23 ENCOUNTER FOR IMMUNIZATION: ICD-10-CM

## 2021-03-17 ENCOUNTER — IMMUNIZATIONS (OUTPATIENT)
Dept: FAMILY MEDICINE CLINIC | Facility: HOSPITAL | Age: 56
End: 2021-03-17

## 2021-03-17 DIAGNOSIS — Z23 ENCOUNTER FOR IMMUNIZATION: Primary | ICD-10-CM

## 2021-03-17 PROCEDURE — 0001A SARS-COV-2 / COVID-19 MRNA VACCINE (PFIZER-BIONTECH) 30 MCG: CPT

## 2021-03-17 PROCEDURE — 91300 SARS-COV-2 / COVID-19 MRNA VACCINE (PFIZER-BIONTECH) 30 MCG: CPT

## 2021-03-24 ENCOUNTER — OFFICE VISIT (OUTPATIENT)
Dept: FAMILY MEDICINE CLINIC | Facility: CLINIC | Age: 56
End: 2021-03-24
Payer: COMMERCIAL

## 2021-03-24 VITALS
HEART RATE: 98 BPM | WEIGHT: 203 LBS | OXYGEN SATURATION: 96 % | BODY MASS INDEX: 27.5 KG/M2 | RESPIRATION RATE: 16 BRPM | HEIGHT: 72 IN | DIASTOLIC BLOOD PRESSURE: 60 MMHG | TEMPERATURE: 97.7 F | SYSTOLIC BLOOD PRESSURE: 122 MMHG

## 2021-03-24 DIAGNOSIS — F34.1 DYSTHYMIA: Primary | ICD-10-CM

## 2021-03-24 DIAGNOSIS — F41.0 PANIC DISORDER WITHOUT AGORAPHOBIA: ICD-10-CM

## 2021-03-24 PROCEDURE — 3008F BODY MASS INDEX DOCD: CPT | Performed by: FAMILY MEDICINE

## 2021-03-24 PROCEDURE — 99214 OFFICE O/P EST MOD 30 MIN: CPT | Performed by: FAMILY MEDICINE

## 2021-03-24 PROCEDURE — 1036F TOBACCO NON-USER: CPT | Performed by: FAMILY MEDICINE

## 2021-03-24 PROCEDURE — 3725F SCREEN DEPRESSION PERFORMED: CPT | Performed by: FAMILY MEDICINE

## 2021-03-24 RX ORDER — DIAZEPAM 5 MG/1
TABLET ORAL
COMMUNITY
Start: 2021-03-17 | End: 2021-03-25

## 2021-03-24 RX ORDER — VENLAFAXINE HYDROCHLORIDE 37.5 MG/1
37.5 CAPSULE, EXTENDED RELEASE ORAL
Qty: 30 CAPSULE | Refills: 1 | Status: SHIPPED | OUTPATIENT
Start: 2021-03-24 | End: 2021-04-07

## 2021-03-24 RX ORDER — FENOFIBRATE 145 MG/1
145 TABLET, COATED ORAL DAILY
COMMUNITY
Start: 2021-01-19

## 2021-03-24 RX ORDER — TRIAMTERENE AND HYDROCHLOROTHIAZIDE 37.5; 25 MG/1; MG/1
CAPSULE ORAL
COMMUNITY
Start: 2021-03-16

## 2021-03-24 NOTE — PATIENT INSTRUCTIONS
DISCUSSED OPTIONS  SUPPORT GIVEN  CONTINUE THERAPY  CONSIDER TRIAL OF EFFEXOR      RV 2 WEEKS, CALL SOONER PRN

## 2021-03-24 NOTE — PROGRESS NOTES
BMI Counseling: Body mass index is 27 53 kg/m²  The BMI is above normal  Nutrition recommendations include encouraging healthy choices of fruits and vegetables and moderation in carbohydrate intake  Exercise recommendations include exercising 3-5 times per week  No pharmacotherapy was ordered  Depression Screening and Follow-up Plan: Patient's depression screening was positive with a PHQ-2 score of 6  Their PHQ-9 score was 16  Patient assessed for underlying major depression  Brief counseling provided and recommend additional follow-up/re-evaluation next office visit  Assessment/Plan:    No problem-specific Assessment & Plan notes found for this encounter  Diagnoses and all orders for this visit:    Dysthymia  -     venlafaxine (EFFEXOR-XR) 37 5 mg 24 hr capsule; Take 1 capsule (37 5 mg total) by mouth daily with breakfast    Panic disorder without agoraphobia  -     venlafaxine (EFFEXOR-XR) 37 5 mg 24 hr capsule; Take 1 capsule (37 5 mg total) by mouth daily with breakfast    Other orders  -     triamterene-hydrochlorothiazide (DYAZIDE) 37 5-25 mg per capsule  -     diazepam (VALIUM) 5 mg tablet; TAKE ONE TABLET EVERY 8 HOURS AS NEEDED FOR vertigo  -     fenofibrate (TRICOR) 145 mg tablet          Patient Instructions   DISCUSSED OPTIONS  SUPPORT GIVEN  CONTINUE THERAPY  CONSIDER TRIAL OF EFFEXOR      RV 2 WEEKS, CALL SOONER PRN      Return in about 2 weeks (around 4/7/2021) for Recheck VIRTUAL  Subjective:      Patient ID: Jose Noland is a 64 y o  male  Chief Complaint   Patient presents with    Medication Management    depression screening = 16     provider informed       RECENT DEPRESSIVE ANXIETY SYMPTOMS  CHRONIC MEDICAL ISSUES - VERTIGO GETTING HIM DOWN  ANXIOUS  FEELING OVERWHELMED  NOT SLEEPING WELL  OCC THOUGHTS OF HURTING HIMSELF - NO PLAN - NO FEELINGS AT PRESENT  STARTED THERAPY YESTERDAY    Depression  This is a new problem  The current episode started more than 1 month ago  The problem occurs constantly  The problem has been waxing and waning  Associated symptoms include headaches and vertigo  Pertinent negatives include no abdominal pain, anorexia, arthralgias, change in bowel habit, chest pain, chills, congestion, coughing, diaphoresis, fatigue, fever, joint swelling, myalgias, nausea, neck pain, numbness, rash, sore throat, swollen glands, urinary symptoms, visual change, vomiting or weakness  Nothing aggravates the symptoms  He has tried nothing for the symptoms  The following portions of the patient's history were reviewed and updated as appropriate: allergies, current medications, past family history, past medical history, past social history, past surgical history and problem list     Review of Systems   Constitutional: Negative for chills, diaphoresis, fatigue and fever  HENT: Negative for congestion, ear discharge, ear pain, mouth sores, postnasal drip, sore throat and trouble swallowing  Eyes: Negative for pain, discharge and visual disturbance  Respiratory: Negative for cough, shortness of breath and wheezing  Cardiovascular: Negative for chest pain, palpitations and leg swelling  Gastrointestinal: Negative for abdominal distention, abdominal pain, anorexia, blood in stool, change in bowel habit, diarrhea, nausea and vomiting  Endocrine: Negative for polydipsia, polyphagia and polyuria  Genitourinary: Negative for dysuria, frequency, hematuria and urgency  Musculoskeletal: Negative for arthralgias, gait problem, joint swelling, myalgias and neck pain  Skin: Negative for pallor and rash  Neurological: Positive for vertigo and headaches  Negative for dizziness, syncope, speech difficulty, weakness, light-headedness and numbness  Hematological: Negative for adenopathy  Psychiatric/Behavioral: Positive for depression and dysphoric mood  Negative for behavioral problems, confusion and sleep disturbance  The patient is nervous/anxious  Current Outpatient Medications   Medication Sig Dispense Refill    atorvastatin (LIPITOR) 10 mg tablet atorvastatin 10 mg tablet      diazepam (VALIUM) 5 mg tablet TAKE ONE TABLET EVERY 8 HOURS AS NEEDED FOR vertigo      Empagliflozin-Metformin HCl (SYNJARDY) 12 5-1000 MG TABS Synjardy 12 5 mg-1,000 mg tablet      fenofibrate (TRICOR) 145 mg tablet       glucose blood (ONETOUCH VERIO) test strip by In Vitro route      insulin glargine (Toujeo Max SoloStar) 300 units/mL CONCETRATED U-300 injection pen (2-unit dial) Inject 60 Units under the skin daily      triamterene-hydrochlorothiazide (DYAZIDE) 37 5-25 mg per capsule       ipratropium (ATROVENT) 0 06 % nasal spray inhale 2 spray by Intranasal route 2 times every day in each nostril      tobramycin-dexamethasone (TOBRADEX) ophthalmic suspension Administer 2 drops to both eyes 4 (four) times a day for 5 days 2 5 mL 0    venlafaxine (EFFEXOR-XR) 37 5 mg 24 hr capsule Take 1 capsule (37 5 mg total) by mouth daily with breakfast 30 capsule 1     No current facility-administered medications for this visit          Objective:    /60   Pulse 98   Temp 97 7 °F (36 5 °C) (Temporal)   Resp 16   Ht 6' (1 829 m)   Wt 92 1 kg (203 lb)   SpO2 96%   BMI 27 53 kg/m²        Physical Exam       NO PE WAS PERFORMED    LENGTH OF VISIT 25 MIN  LENGTH OF  25 MIN    Ray De Paz MD

## 2021-03-25 ENCOUNTER — TELEPHONE (OUTPATIENT)
Dept: FAMILY MEDICINE CLINIC | Facility: CLINIC | Age: 56
End: 2021-03-25

## 2021-03-25 DIAGNOSIS — F41.0 PANIC DISORDER WITHOUT AGORAPHOBIA: Primary | ICD-10-CM

## 2021-03-25 DIAGNOSIS — F34.1 DYSTHYMIA: ICD-10-CM

## 2021-03-25 RX ORDER — ALPRAZOLAM 0.25 MG/1
0.25 TABLET ORAL 2 TIMES DAILY PRN
Qty: 28 TABLET | Refills: 0 | Status: SHIPPED | OUTPATIENT
Start: 2021-03-25 | End: 2021-05-12 | Stop reason: HOSPADM

## 2021-03-25 NOTE — TELEPHONE ENCOUNTER
Candice Stewart  states you were going to send in a rx for xanax along with the venlafaxine to   [address]  They got the venlafaxine, but not the xanax

## 2021-03-26 ENCOUNTER — TELEPHONE (OUTPATIENT)
Dept: ADMINISTRATIVE | Facility: OTHER | Age: 56
End: 2021-03-26

## 2021-03-26 NOTE — TELEPHONE ENCOUNTER
----- Message from Al Matos LPN sent at 0/[phone]/[phone]  4:25 PM EDT -----  Regarding: Care Gap Requst  03/25/21 4:25 PM    Hello, our patient attached above has had Diabetic Eye Exam, Diabetic Foot Exam, and Urine Microalbumin completed/performed  Please assist in updating the patient chart by making an External outreach to Dr Roberto Carlos Graham @ [facility] located in [facility]    [facility], [phone] (c) [phone]    Thank you,  Al Matos LPN  [address]

## 2021-03-26 NOTE — LETTER
Lab Result(s) Request Form: Urine Microalbumin or Protein Creatinine Ratio      Date Requested: 21  Patient: Jessie Haynes  Patient : 1965   Referring Provider: Jose Carrion MD        Date of Lab Collection ______________________________       The above patient has informed us that they have completed their   most recent Urine Microalbumin or Protein Creatinine Ratio at your facility  Please complete   this form and attach all corresponding procedure reports/results  Comments __________________________________________________________  ____________________________________________________________________  ____________________________________________________________________  ____________________________________________________________________    Collecting/Resulting Facility  ___________________________________________  Form Completed By (print name) ________________________________________    Signature ___________________________________________________________      These reports are needed for  compliance    Please fax this completed form and a copy of the lab results/report to our office located at [place] [id number] as soon as possible to [phone] attention Marge: Phone [phone]    We thank you for your assistance in treating our mutual patient         Diabetic Eye Exam Form    Date Requested: 21  Patient: Jessie Haynes  Patient : 1965   Referring Provider: Jose Carrion MD    Dilated Retinal Exam, Optomap-Iris Exam, or Fundus Photography Done         Yes ([place] one above)         No     Date of Diabetic Eye Exam ______________________________  Left Eye      Exam did show retinopathy    Exam did not show retinopathy         Mild       Moderate       None       Proliferative       Severe     Right Eye     Exam did show retinopathy    Exam did not show retinopathy         Mild       Moderate       None       Proliferative Severe     Comments __________________________________________________________    Practice Providing Exam ______________________________________________    Exam Performed By (print name) _______________________________________      Provider Signature ___________________________________________________      These reports are needed for  compliance  Please fax this completed form and a copy of the Diabetic Eye Exam report to our office located at [address] [place] [address] as soon as possible to [phone] attention Marge: Phone [phone]    We thank you for your assistance in treating our mutual patient         Diabetic Foot Exam Form    Date Requested: 21  Patient: Juli Roa  Patient : 1965   Referring Provider: Coby Reese MD    Diabetic Foot Exam Performed with shoes and socks removed        Yes         No     Date of Diabetic Foot Exam ______________________________  Risk Score ____________________________________________    Left Foot       Visual Inspection         Monofilament Testing Sensory Exam        Pedal Pulses         Additional Comments         Right Foot      Visual Inspection         Monofilament Testing Sensory Exam       Pedal Pulses         Additional Comments         Comments __________________________________________________________    Practice Providing Exam ______________________________________________    Exam Performed By (print name) _______________________________________      Provider Signature ___________________________________________________      These reports are needed for  compliance    Please fax this completed form and a copy of the Diabetic Foot Exam report to our office located at [place] [address] as soon as possible to [phone] attention Marge: Phone [phone]    We thank you for your assistance in treating our mutual patient

## 2021-03-26 NOTE — LETTER
Diabetic Foot Exam Form    Date Requested: 21  Patient: Merilynn Gosselin  Patient : 1965   Referring Provider: Louise Voss MD    Diabetic Foot Exam Performed with shoes and socks removed        Yes         No     Date of Diabetic Foot Exam ______________________________  Risk Score ____________________________________________    Left Foot       Visual Inspection         Monofilament Testing Sensory Exam        Pedal Pulses         Additional Comments         Right Foot      Visual Inspection         Monofilament Testing Sensory Exam       Pedal Pulses         Additional Comments         Comments __________________________________________________________    Practice Providing Exam ______________________________________________    Exam Performed By (print name) _______________________________________      Provider Signature ___________________________________________________      These reports are needed for  compliance  Please fax this completed form and a copy of the Diabetic Foot Exam report to our office located at [place] [address] as soon as possible to [phone] attention Marge: Phone [phone]    We thank you for your assistance in treating our mutual patient             Lab Result(s) Request Form: Urine Microalbumin or Protein Creatinine Ratio      Date Requested: 21  Patient: Merilynn Gosselin  Patient : 1965   Referring Provider: Louise Voss MD        Date of Lab Collection ______________________________       The above patient has informed us that they have completed their   most recent Urine Microalbumin or Protein Creatinine Ratio at your facility  Please complete   this form and attach all corresponding procedure reports/results      Comments __________________________________________________________  ____________________________________________________________________  ____________________________________________________________________  ____________________________________________________________________    Collecting/Resulting Facility  ___________________________________________  Form Completed By (print name) ________________________________________    Signature ___________________________________________________________      These reports are needed for  compliance  Please fax this completed form and a copy of the lab results/report to our office located at [place] [address] as soon as possible to [phone] attention Marge: Phone [phone]    We thank you for your assistance in treating our mutual patient           Diabetic Eye Exam Form    Date Requested: 21  Patient: Louise Pollen  Patient : 1965   Referring Provider: Jaylyn Landeros MD    Dilated Retinal Exam, Optomap-Iris Exam, or Fundus Photography Done         Yes ([place] one above)         No     Date of Diabetic Eye Exam ______________________________  Left Eye      Exam did show retinopathy    Exam did not show retinopathy         Mild       Moderate       None       Proliferative       Severe     Right Eye     Exam did show retinopathy    Exam did not show retinopathy         Mild       Moderate       None       Proliferative       Severe     Comments __________________________________________________________    Practice Providing Exam ______________________________________________    Exam Performed By (print name) _______________________________________      Provider Signature ___________________________________________________      These reports are needed for  compliance    Please fax this completed form and a copy of the Diabetic Eye Exam report to our office located at [address] [place] [address] as soon as possible to [phone] attention Marge: Phone [phone]    We thank you for your assistance in treating our mutual patient

## 2021-03-30 NOTE — TELEPHONE ENCOUNTER
As a follow-up, a second attempt has been made for outreach via fax, please see Contacts section for details      Thank you  Em Ramos MA

## 2021-03-31 NOTE — TELEPHONE ENCOUNTER
Upon review of the In Basket request we were able to locate, review, and update the patient chart as requested for Diabetic Foot Exam and Urine Microalbumin  Endocrinology does not perform DM eye exam     Any additional questions or concerns should be emailed to the Practice Liaisons via [email or web address] email, please do not reply via In Basket      Thank you  Charmayne Bathe, MA

## 2021-04-05 DIAGNOSIS — Z00.00 ANNUAL PHYSICAL EXAM: Primary | ICD-10-CM

## 2021-04-05 DIAGNOSIS — Z12.5 SCREENING PSA (PROSTATE SPECIFIC ANTIGEN): ICD-10-CM

## 2021-04-05 DIAGNOSIS — Z11.4 SCREENING FOR HIV (HUMAN IMMUNODEFICIENCY VIRUS): ICD-10-CM

## 2021-04-05 DIAGNOSIS — Z13.6 SCREENING FOR HYPERTENSION: ICD-10-CM

## 2021-04-05 DIAGNOSIS — Z13.220 SCREENING FOR LIPID DISORDERS: ICD-10-CM

## 2021-04-05 DIAGNOSIS — Z11.59 ENCOUNTER FOR HEPATITIS C SCREENING TEST FOR LOW RISK PATIENT: ICD-10-CM

## 2021-04-05 DIAGNOSIS — Z13.29 SCREENING FOR THYROID DISORDER: ICD-10-CM

## 2021-04-05 DIAGNOSIS — E11.42 TYPE 2 DIABETES MELLITUS WITH DIABETIC POLYNEUROPATHY, WITHOUT LONG-TERM CURRENT USE OF INSULIN (HCC): ICD-10-CM

## 2021-04-06 RX ORDER — FLUOXETINE HYDROCHLORIDE 20 MG/1
20 CAPSULE ORAL DAILY
COMMUNITY
Start: 2021-04-05 | End: 2021-05-12 | Stop reason: HOSPADM

## 2021-04-06 RX ORDER — PROPRANOLOL HYDROCHLORIDE 10 MG/1
10 TABLET ORAL 2 TIMES DAILY
COMMUNITY
Start: 2021-04-05

## 2021-04-06 RX ORDER — TRAZODONE HYDROCHLORIDE 100 MG/1
100 TABLET ORAL
COMMUNITY
Start: 2021-04-05

## 2021-04-06 RX ORDER — DIAZEPAM 5 MG/1
TABLET ORAL
COMMUNITY
Start: 2021-03-17

## 2021-04-06 RX ORDER — HYDROXYZINE PAMOATE 50 MG/1
50 CAPSULE ORAL 3 TIMES DAILY PRN
COMMUNITY
Start: 2021-04-05

## 2021-04-06 RX ORDER — TRIAMTERENE AND HYDROCHLOROTHIAZIDE 37.5; 25 MG/1; MG/1
1 CAPSULE ORAL DAILY
COMMUNITY
Start: 2020-12-11 | End: 2021-04-07 | Stop reason: SDUPTHER

## 2021-04-07 ENCOUNTER — TELEMEDICINE (OUTPATIENT)
Dept: FAMILY MEDICINE CLINIC | Facility: CLINIC | Age: 56
End: 2021-04-07
Payer: COMMERCIAL

## 2021-04-07 ENCOUNTER — TRANSITIONAL CARE MANAGEMENT (OUTPATIENT)
Dept: FAMILY MEDICINE CLINIC | Facility: CLINIC | Age: 56
End: 2021-04-07

## 2021-04-07 VITALS — BODY MASS INDEX: 27.5 KG/M2 | WEIGHT: 203 LBS | HEIGHT: 72 IN

## 2021-04-07 DIAGNOSIS — F34.1 DYSTHYMIA: ICD-10-CM

## 2021-04-07 DIAGNOSIS — F41.0 PANIC DISORDER WITHOUT AGORAPHOBIA: ICD-10-CM

## 2021-04-07 DIAGNOSIS — Z76.89 ENCOUNTER FOR SUPPORT AND COORDINATION OF TRANSITION OF CARE: Primary | ICD-10-CM

## 2021-04-07 PROBLEM — H01.001 BLEPHARITIS OF RIGHT UPPER EYELID: Status: RESOLVED | Noted: 2020-03-05 | Resolved: 2021-04-07

## 2021-04-07 PROBLEM — H57.89 EYE SWELLING, RIGHT: Status: RESOLVED | Noted: 2020-03-05 | Resolved: 2021-04-07

## 2021-04-07 PROCEDURE — 99495 TRANSJ CARE MGMT MOD F2F 14D: CPT | Performed by: FAMILY MEDICINE

## 2021-04-07 PROCEDURE — 3008F BODY MASS INDEX DOCD: CPT | Performed by: FAMILY MEDICINE

## 2021-04-07 RX ORDER — EMPAGLIFLOZIN AND METFORMIN HYDROCHLORIDE 5; 1000 MG/1; MG/1
TABLET ORAL 2 TIMES DAILY
COMMUNITY
End: 2022-06-21

## 2021-04-07 NOTE — PROGRESS NOTES
Assessment/Plan:        Problem List Items Addressed This Visit        Other    Anxiety disorder    Dysthymia    Encounter for support and coordination of transition of care - Primary             Reason for visit is TRANSITION OF CARE    Encounter provider Zaida Kuhn MD       Provider located at [address] [place] [id number]      Recent Visits  No visits were found meeting these conditions  Showing recent visits within past 7 days and meeting all other requirements     Today's Visits  Date Type Provider Dept   04/07/21 Telemedicine Zaida Kuhn MD [facility]   Showing today's visits and meeting all other requirements     Future Appointments  No visits were found meeting these conditions  Showing future appointments within next 150 days and meeting all other requirements        After connecting through [employer], the patient was identified by name and date of birth  Brice Cote was informed that this is a telemedicine visit and that the visit is being conducted through [employer] and patient was informed that this is a secure, HIPAA-compliant platform  He agrees to proceed     My office door was closed  No one else was in the room  He acknowledged consent and understanding of privacy and security of the video platform  The patient has agreed to participate and understands they can discontinue the visit at any time  Patient is aware this is a billable service  Subjective:     Patient ID: Brice Cote is a 64 y o  male      TRANSITION OF CARE    PATIENT IS S/P [facility] ADMISSION FOR SEVERE DEPRESSION AND SUICIDAL IDEATION    DATE OF DISCHARGE  4/5/2021    REVIEWED HOSPITAL COURSE, DISCHARGE MEDICATIONS AND INSTRUCTIONS    PATIENT IS FEELING BETTER  HAS OUTPATIENT INTAKE TODAY  TOLERATING MEDICATION WELL  DENIES ANY SUICIDAL IDEATION AT THIS TIME    DISCUSSED THERAPEUTIC PLAN  SUPPORT GIVEN  PATIENT IS RETURNING FOR CPX IN 4-5 WEEKS    ENCOURAGED TO CALL SOONER PRN      Review of Systems   Constitutional: Negative for chills, fatigue and fever  HENT: Negative for congestion, ear discharge, ear pain, mouth sores, postnasal drip, sore throat and trouble swallowing  Eyes: Negative for pain, discharge and visual disturbance  Respiratory: Negative for cough, shortness of breath and wheezing  Cardiovascular: Negative for chest pain, palpitations and leg swelling  Gastrointestinal: Negative for abdominal distention, abdominal pain, blood in stool, diarrhea and nausea  Endocrine: Negative for polydipsia, polyphagia and polyuria  Genitourinary: Negative for dysuria, frequency, hematuria and urgency  Musculoskeletal: Negative for arthralgias, gait problem and joint swelling  Skin: Negative for pallor and rash  Neurological: Negative for dizziness, syncope, speech difficulty, weakness, light-headedness, numbness and headaches  Hematological: Negative for adenopathy  Psychiatric/Behavioral: Positive for dysphoric mood  Negative for behavioral problems, confusion and sleep disturbance  The patient is nervous/anxious  Objective:    Vitals:    04/07/21 0828   Weight: 92 1 kg (203 lb)   Height: 6' (1 829 m)       Physical Exam        PATIENT VISUALLY APPEARS WELL IN NO OBVIOUS DISTRESS      Transitional Care Management Review:  Anna Marie Lilly is a 64 y o  male here for TCM follow up       During the TCM phone call patient stated:    TCM Call (since 3/7/2021)     Date and time call was made  4/7/2021  9:04 AM    Hospital care reviewed  Records reviewed    Patient was hospitialized at  Other (comment)        Comment  [facility] [place]    Date of Admission  03/29/21    Date of discharge  04/05/21    Diagnosis  suicidal thoughts and panic disorder    Disposition  Home    Were the patients medications reviewed and updated  Yes    Current Symptoms  None      TCM Call (since 3/7/2021)     Post hospital issues  Reduced activity    Should patient be enrolled in anticoag monitoring? No    Scheduled for follow up? Yes    Patients specialists  Other (comment)    Other specialists names  has intake appt today for outpt program    Did you obtain your prescribed medications  Yes    Do you need help managing your prescriptions or medications  No    Is transportation to your appointment needed  No    I have advised the patient to call PCP with any new or worsening symptoms  jennifer talavera CMA    Living Arrangements  Spouse or Significiant other    Support System  Spouse    The type of support provided  Emotional; Financial; Physical    Do you have social support  Yes, as much as I need    Are you recieving any outpatient services  No    Are you recieving home care services  No    Are you using any community resources  No    Current waiver services  No    Have you fallen in the last 12 months  No    Interperter language line needed  No    Counseling  Patient    Counseling topics  Importance of RX compliance          I spent 20 minutes with the patient during this visit      Tejas Hernandez MD

## 2021-04-07 NOTE — PATIENT INSTRUCTIONS
CONTINUE CURRENT TREATMENT PLAN  PSYCHIATRIC FOLLOW UP TODAY  SUPPORT    RV 4-5 WEEKS FOR CPX, CALL SOONER PRN

## 2021-04-08 ENCOUNTER — IMMUNIZATIONS (OUTPATIENT)
Dept: FAMILY MEDICINE CLINIC | Facility: HOSPITAL | Age: 56
End: 2021-04-08

## 2021-04-08 DIAGNOSIS — Z23 ENCOUNTER FOR IMMUNIZATION: Primary | ICD-10-CM

## 2021-04-08 PROCEDURE — 0002A SARS-COV-2 / COVID-19 MRNA VACCINE (PFIZER-BIONTECH) 30 MCG: CPT

## 2021-04-08 PROCEDURE — 91300 SARS-COV-2 / COVID-19 MRNA VACCINE (PFIZER-BIONTECH) 30 MCG: CPT

## 2021-04-09 LAB — HBA1C MFR BLD HPLC: 7.7 %

## 2021-04-09 PROCEDURE — 3051F HG A1C>EQUAL 7.0%<8.0%: CPT | Performed by: FAMILY MEDICINE

## 2021-05-06 ENCOUNTER — RA CDI HCC (OUTPATIENT)
Dept: OTHER | Facility: HOSPITAL | Age: 56
End: 2021-05-06

## 2021-05-06 NOTE — PROGRESS NOTES
[facility]  coding opportunities             Chart reviewed, (number of) suggestions sent to provider: 1     Problem listed updated  Provider Accepted, (number of) suggestions accepted: 1     Provider Rejected Suggestions for: Suggestion not used  A1c is within range     Patients insurance company: [employer] (Medicare and Commercial for [employer] and [employer])     Visit status: Patient arrived for their scheduled appointment        [facility] [employer]  coding opportunities             Chart reviewed, (number of) suggestions sent to provider: 1     Problem listed updated   Provider Accepted, (number of) suggestions accepted: 1        Patients insurance company: [employer] (Medicare and Commercial for [employer] and [employer])           [facility] [employer]  coding opportunities             Chart reviewed, (number of) suggestions sent to provider: 1  E11 65  Type 2 diabetes mellitus with hyperglycemia           Patients insurance company: [employer] (Medicare and Commercial for [employer] and [employer])

## 2021-05-12 ENCOUNTER — OFFICE VISIT (OUTPATIENT)
Dept: FAMILY MEDICINE CLINIC | Facility: CLINIC | Age: 56
End: 2021-05-12
Payer: COMMERCIAL

## 2021-05-12 VITALS
WEIGHT: 200 LBS | TEMPERATURE: 97.7 F | HEIGHT: 72 IN | BODY MASS INDEX: 27.09 KG/M2 | SYSTOLIC BLOOD PRESSURE: 118 MMHG | OXYGEN SATURATION: 95 % | RESPIRATION RATE: 12 BRPM | HEART RATE: 88 BPM | DIASTOLIC BLOOD PRESSURE: 76 MMHG

## 2021-05-12 DIAGNOSIS — Z00.00 ROUTINE GENERAL MEDICAL EXAMINATION AT A HEALTH CARE FACILITY: Primary | ICD-10-CM

## 2021-05-12 DIAGNOSIS — Z12.5 ENCOUNTER FOR PROSTATE CANCER SCREENING: ICD-10-CM

## 2021-05-12 DIAGNOSIS — Z12.11 COLON CANCER SCREENING: ICD-10-CM

## 2021-05-12 DIAGNOSIS — F41.0 PANIC DISORDER WITHOUT AGORAPHOBIA: ICD-10-CM

## 2021-05-12 DIAGNOSIS — E11.42 TYPE 2 DIABETES MELLITUS WITH DIABETIC POLYNEUROPATHY, WITHOUT LONG-TERM CURRENT USE OF INSULIN (HCC): ICD-10-CM

## 2021-05-12 DIAGNOSIS — G47.33 MODERATE OBSTRUCTIVE SLEEP APNEA: ICD-10-CM

## 2021-05-12 DIAGNOSIS — E78.2 MIXED HYPERLIPIDEMIA: ICD-10-CM

## 2021-05-12 DIAGNOSIS — G62.9 NEUROPATHY: ICD-10-CM

## 2021-05-12 DIAGNOSIS — F34.1 DYSTHYMIA: ICD-10-CM

## 2021-05-12 DIAGNOSIS — Z13.29 SCREENING FOR HYPOTHYROIDISM: ICD-10-CM

## 2021-05-12 DIAGNOSIS — G43.909 MIGRAINE WITHOUT STATUS MIGRAINOSUS, NOT INTRACTABLE, UNSPECIFIED MIGRAINE TYPE: ICD-10-CM

## 2021-05-12 DIAGNOSIS — C44.310 BASAL CELL CARCINOMA (BCC) OF SKIN OF FACE, UNSPECIFIED PART OF FACE: ICD-10-CM

## 2021-05-12 PROBLEM — I10 ESSENTIAL HYPERTENSION: Status: ACTIVE | Noted: 2018-05-09

## 2021-05-12 PROBLEM — Z76.89 ENCOUNTER FOR SUPPORT AND COORDINATION OF TRANSITION OF CARE: Status: RESOLVED | Noted: 2021-04-07 | Resolved: 2021-05-12

## 2021-05-12 LAB — SL AMB POCT FECES OCC BLD: NORMAL

## 2021-05-12 PROCEDURE — 3725F SCREEN DEPRESSION PERFORMED: CPT | Performed by: FAMILY MEDICINE

## 2021-05-12 PROCEDURE — 99396 PREV VISIT EST AGE 40-64: CPT | Performed by: FAMILY MEDICINE

## 2021-05-12 PROCEDURE — 82270 OCCULT BLOOD FECES: CPT | Performed by: FAMILY MEDICINE

## 2021-05-12 PROCEDURE — 93000 ELECTROCARDIOGRAM COMPLETE: CPT | Performed by: FAMILY MEDICINE

## 2021-05-12 PROCEDURE — 4004F PT TOBACCO SCREEN RCVD TLK: CPT | Performed by: FAMILY MEDICINE

## 2021-05-12 PROCEDURE — 3008F BODY MASS INDEX DOCD: CPT | Performed by: FAMILY MEDICINE

## 2021-05-12 RX ORDER — DULOXETIN HYDROCHLORIDE 30 MG/1
60 CAPSULE, DELAYED RELEASE ORAL DAILY
COMMUNITY
Start: 2021-04-30

## 2021-05-12 RX ORDER — TRAMADOL HYDROCHLORIDE 50 MG/1
50 TABLET ORAL
COMMUNITY
Start: 2021-04-27

## 2021-05-12 RX ORDER — OLANZAPINE 2.5 MG/1
TABLET ORAL
COMMUNITY
Start: 2021-04-30

## 2021-05-12 NOTE — LETTER
Tucker Martinez      Current Outpatient Medications:     ALPRAZolam (XANAX) 0 25 mg tablet, Take 1 tablet (0 25 mg total) by mouth 2 (two) times a day as needed for anxiety (Patient not taking: Reported on 4/7/2021), Disp: 28 tablet, Rfl: 0    atorvastatin (LIPITOR) 10 mg tablet, Take 10 mg by mouth daily , Disp: , Rfl:     diazepam (Valium) 5 mg tablet, one tab po q 8 hours prn vertigo, Disp: , Rfl:     Empagliflozin-Metformin HCl (SYNJARDY) 12 5-1000 MG TABS, Synjardy 12 5 mg-1,000 mg tablet, Disp: , Rfl:     Empagliflozin-metFORMIN HCl (Synjardy) 5-1000 MG TABS, Take by mouth 2 (two) times a day, Disp: , Rfl:     fenofibrate (TRICOR) 145 mg tablet, Take 145 mg by mouth daily , Disp: , Rfl:     FLUoxetine (PROzac) 20 mg capsule, Take 20 mg by mouth daily , Disp: , Rfl:     glucose blood (ONETOUCH VERIO) test strip, by In Vitro route, Disp: , Rfl:     hydrOXYzine pamoate (VISTARIL) 50 mg capsule, Take 50 mg by mouth 3 (three) times a day as needed , Disp: , Rfl:     insulin glargine (Toujeo Max SoloStar) 300 units/mL CONCETRATED U-300 injection pen (2-unit dial), Inject 60 Units under the skin daily, Disp: , Rfl:     ipratropium (ATROVENT) 0 06 % nasal spray, inhale 2 spray by Intranasal route 2 times every day in each nostril, Disp: , Rfl:     propranolol (INDERAL) 10 mg tablet, Take 10 mg by mouth 2 (two) times a day , Disp: , Rfl:     tobramycin-dexamethasone (TOBRADEX) ophthalmic suspension, Administer 2 drops to both eyes 4 (four) times a day for 5 days, Disp: 2 5 mL, Rfl: 0    traZODone (DESYREL) 100 mg tablet, Take 100 mg by mouth daily at bedtime , Disp: , Rfl:     triamterene-hydrochlorothiazide (DYAZIDE) 37 5-25 mg per capsule, , Disp: , Rfl:       Recent Results (from the past 2688 hour(s))   Hemoglobin A1C    Collection Time: 04/09/21 12:00 AM   Result Value Ref Range    Hemoglobin A1C 7 7    CBC and differential    Collection Time: 05/11/21  7:53 AM   Result Value Ref Range    White Blood Cell Count 6 4 3 8 - 10 8 Thousand/uL    Red Blood Cell Count 5 29 4  20 - 5 80 Million/uL    Hemoglobin 15 9 13 2 - 17 1 g/dL    HCT 46 8 38 5 - 50 0 %    MCV 88 5 80 0 - 100 0 fL    MCH 30 1 27 0 - 33 0 pg    MCHC 34 0 32 0 - 36 0 g/dL    RDW 13 0 11 0 - 15 0 %    Platelet Count 160 501 - 400 Thousand/uL    SL AMB MPV 9 4 7 5 - 12 5 fL    Neutrophils (Absolute) 3,290 1,500 - 7,800 cells/uL    Lymphocytes (Absolute) 2,202 850 - 3,900 cells/uL    Monocytes (Absolute) 640 200 - 950 cells/uL    Eosinophils (Absolute) 192 15 - 500 cells/uL    Basophils ABS 77 0 - 200 cells/uL    Neutrophils 51 4 %    Lymphocytes 34 4 %    Monocytes 10 0 %    Eosinophils 3 0 %    Basophils PCT 1 2 %   Comprehensive metabolic panel    Collection Time: 05/11/21  7:53 AM   Result Value Ref Range    Glucose, Random 140 (H) 65 - 99 mg/dL    BUN 15 7 - 25 mg/dL    Creatinine 0 75 0 70 - 1 33 mg/dL    eGFR Non  103 > OR = 60 mL/min/1 73m2    eGFR  119 > OR = 60 mL/min/1 73m2    SL AMB BUN/CREATININE RATIO NOT APPLICABLE 6 - 22 (calc)    Sodium 138 135 - 146 mmol/L    Potassium 4 2 3 5 - 5 3 mmol/L    Chloride 99 98 - 110 mmol/L    CO2 31 20 - 32 mmol/L    Calcium 9 6 8 6 - 10 3 mg/dL    Protein, Total 7 1 6 1 - 8 1 g/dL    Albumin 4 5 3 6 - 5 1 g/dL    Globulin 2 6 1 9 - 3 7 g/dL (calc)    Albumin/Globulin Ratio 1 7 1 0 - 2 5 (calc)    TOTAL BILIRUBIN 0 7 0 2 - 1 2 mg/dL    Alkaline Phosphatase 54 35 - 144 U/L    AST 16 10 - 35 U/L    ALT 22 9 - 46 U/L   Lipid panel    Collection Time: 05/11/21  7:53 AM   Result Value Ref Range    Total Cholesterol 183 <200 mg/dL    HDL 36 (L) > OR = 40 mg/dL    Triglycerides 419 (H) <150 mg/dL    LDL Calculated  mg/dL (calc)    Chol HDLC Ratio 5 1 (H) <5 0 (calc)    Non-HDL Cholesterol 147 (H) <130 mg/dL (calc)   PSA Total (Reflex To Free)    Collection Time: 05/11/21  7:53 AM   Result Value Ref Range    Prostate Specific Antigen Total     TSH, 3rd generation    Collection Time: 05/11/21  7:53 AM   Result Value Ref Range    TSH 1 58 0 40 - 4 50 mIU/L   Human Immunodeficiency Virus 1/2 Antigen / Antibody ( Fourth Generation) with Reflex Testing    Collection Time: 05/11/21  7:53 AM   Result Value Ref Range    HIV AG/AB, 4th Gen NON-REACTIVE NON-REACTIVE   Microalbumin, Random Urine (W/Creatinine)    Collection Time: 05/11/21  7:53 AM   Result Value Ref Range    Creatinine, Urine      Microalbum  ,U,Random      Microalb/Creat Ratio     Hemoglobin A1C With EAG    Collection Time: 05/11/21  7:53 AM   Result Value Ref Range    Hemoglobin A1C 7 0 (H) <5 7 % of total Hgb    Estimated Average Glucose 154 (calc)    Estimated Average Glucose (mmol/L) 8 5 (calc)   Hepatitis C Ab W/Refl To HCV RNA, Qn, PCR    Collection Time: 05/11/21  7:53 AM   Result Value Ref Range    HEP C AB NON-REACTIVE NON-REACTIVE    Signal to Cut-Off 0 03 <1 00

## 2021-05-12 NOTE — PATIENT INSTRUCTIONS
DISCUSSED HEALTH MAINTENENCE ISSUES  BW WILL BE REVIEWED  ENCOURAGED HEALTHY DIET AND EXERCISE  COLONOSCOPY WILL BE ORDERED  RV FOR ANNUAL HEALTH EXAM IN 1 YEAR  RV SOONER IF THERE ARE ANY CONCERNS      Recent Results (from the past 2688 hour(s))   Hemoglobin A1C    Collection Time: 04/09/21 12:00 AM   Result Value Ref Range    Hemoglobin A1C 7 7    CBC and differential    Collection Time: 05/11/21  7:53 AM   Result Value Ref Range    White Blood Cell Count 6 4 3 8 - 10 8 Thousand/uL    Red Blood Cell Count 5 29 4  20 - 5 80 Million/uL    Hemoglobin 15 9 13 2 - 17 1 g/dL    HCT 46 8 38 5 - 50 0 %    MCV 88 5 80 0 - 100 0 fL    MCH 30 1 27 0 - 33 0 pg    MCHC 34 0 32 0 - 36 0 g/dL    RDW 13 0 11 0 - 15 0 %    Platelet Count 387 842 - 400 Thousand/uL    SL AMB MPV 9 4 7 5 - 12 5 fL    Neutrophils (Absolute) 3,290 1,500 - 7,800 cells/uL    Lymphocytes (Absolute) 2,202 850 - 3,900 cells/uL    Monocytes (Absolute) 640 200 - 950 cells/uL    Eosinophils (Absolute) 192 15 - 500 cells/uL    Basophils ABS 77 0 - 200 cells/uL    Neutrophils 51 4 %    Lymphocytes 34 4 %    Monocytes 10 0 %    Eosinophils 3 0 %    Basophils PCT 1 2 %   Comprehensive metabolic panel    Collection Time: 05/11/21  7:53 AM   Result Value Ref Range    Glucose, Random 140 (H) 65 - 99 mg/dL    BUN 15 7 - 25 mg/dL    Creatinine 0 75 0 70 - 1 33 mg/dL    eGFR Non  103 > OR = 60 mL/min/1 73m2    eGFR  119 > OR = 60 mL/min/1 73m2    SL AMB BUN/CREATININE RATIO NOT APPLICABLE 6 - 22 (calc)    Sodium 138 135 - 146 mmol/L    Potassium 4 2 3 5 - 5 3 mmol/L    Chloride 99 98 - 110 mmol/L    CO2 31 20 - 32 mmol/L    Calcium 9 6 8 6 - 10 3 mg/dL    Protein, Total 7 1 6 1 - 8 1 g/dL    Albumin 4 5 3 6 - 5 1 g/dL    Globulin 2 6 1 9 - 3 7 g/dL (calc)    Albumin/Globulin Ratio 1 7 1 0 - 2 5 (calc)    TOTAL BILIRUBIN 0 7 0 2 - 1 2 mg/dL    Alkaline Phosphatase 54 35 - 144 U/L    AST 16 10 - 35 U/L    ALT 22 9 - 46 U/L   Lipid panel Collection Time: 05/11/21  7:53 AM   Result Value Ref Range    Total Cholesterol 183 <200 mg/dL    HDL 36 (L) > OR = 40 mg/dL    Triglycerides 419 (H) <150 mg/dL    LDL Calculated  mg/dL (calc)    Chol HDLC Ratio 5 1 (H) <5 0 (calc)    Non-HDL Cholesterol 147 (H) <130 mg/dL (calc)   PSA Total (Reflex To Free)    Collection Time: 05/11/21  7:53 AM   Result Value Ref Range    Prostate Specific Antigen Total     TSH, 3rd generation    Collection Time: 05/11/21  7:53 AM   Result Value Ref Range    TSH 1 58 0 40 - 4 50 mIU/L   Human Immunodeficiency Virus 1/2 Antigen / Antibody ( Fourth Generation) with Reflex Testing    Collection Time: 05/11/21  7:53 AM   Result Value Ref Range    HIV AG/AB, 4th Gen NON-REACTIVE NON-REACTIVE   Microalbumin, Random Urine (W/Creatinine)    Collection Time: 05/11/21  7:53 AM   Result Value Ref Range    Creatinine, Urine      Microalbum  ,U,Random      Microalb/Creat Ratio     Hemoglobin A1C With EAG    Collection Time: 05/11/21  7:53 AM   Result Value Ref Range    Hemoglobin A1C 7 0 (H) <5 7 % of total Hgb    Estimated Average Glucose 154 (calc)    Estimated Average Glucose (mmol/L) 8 5 (calc)   Hepatitis C Ab W/Refl To HCV RNA, Qn, PCR    Collection Time: 05/11/21  7:53 AM   Result Value Ref Range    HEP C AB NON-REACTIVE NON-REACTIVE    Signal to Cut-Off 0 03 <1 00

## 2021-05-12 NOTE — PROGRESS NOTES
Tobacco Cessation Counseling: Tobacco cessation counseling was provided  The patient is sincerely urged to quit consumption of tobacco  He is not ready to quit tobacco  Patient refused medication  FAMILY PRACTICE HEALTH MAINTENANCE OFFICE VISIT  [facility] - [address] [place] [address] PHYSICIANS    NAME: Roopa Temple  AGE: 64 y o  SEX: male  : 1965     DATE: 2021    Assessment and Plan     Problem List Items Addressed This Visit        Endocrine    Type 2 diabetes mellitus with diabetic polyneuropathy ([address] )    Relevant Orders    POCT ECG (Completed)    CT coronary calcium score       Respiratory    Moderate obstructive sleep apnea    Relevant Orders    POCT ECG (Completed)       Cardiovascular and Mediastinum    Migraine       Nervous and Auditory    Neuropathy       Musculoskeletal and Integument    Basal cell carcinoma (BCC) of skin of face    Relevant Orders    Ambulatory referral to Dermatology       Other    Anxiety disorder    Hyperlipidemia, unspecified    Relevant Orders    CT coronary calcium score    Dysthymia    Encounter for prostate cancer screening    Screening for hypothyroidism    Colon cancer screening    Relevant Orders    POCT hemoccult screening    Cologuard    Routine general medical examination at a health care facility - Primary               Return in about 6 months (around 2021) for Recheck  Chief Complaint     Chief Complaint   Patient presents with    Annual Exam       History of Present Illness     [address] RECORD  NO CONCERNS AT THIS TIME        Well Adult Physical   Patient here for a comprehensive physical exam       Diet and Physical Activity  Diet: well balanced diet  Weight concerns: Patient is overweight (BMI 25 0-29  9)  Exercise: intermittently      Depression Screen  PHQ-9 Depression Screening    PHQ-9:   Frequency of the following problems over the past two weeks:      Little interest or pleasure in doing things: 1 - several days  Feeling down, depressed, or hopeless: 1 - several days  Trouble falling or staying asleep, or sleeping too much: 0 - not at all  Feeling tired or having little energy: 0 - not at all  Poor appetite or overeatin - several days  Feeling bad about yourself - or that you are a failure or have let yourself or your family down: 1 - several days  Trouble concentrating on things, such as reading the newspaper or watching television: 0 - not at all  Moving or speaking so slowly that other people could have noticed  Or the opposite - being so fidgety or restless that you have been moving around a lot more than usual: 0 - not at all  Thoughts that you would be better off dead, or of hurting yourself in some way: 0 - not at all  PHQ-2 Score: 2  PHQ-9 Score: 4          General Health  Hearing: Normal:  bilateral  Vision: no vision problems and wears glasses  Dental: regular dental visits    Reproductive Health          The following portions of the patient's history were reviewed and updated as appropriate: allergies, current medications, past family history, past medical history, past social history, past surgical history and problem list     Review of Systems     Review of Systems   Constitutional: Negative for chills, fatigue and fever  HENT: Negative for congestion, ear discharge, ear pain, mouth sores, postnasal drip, sore throat and trouble swallowing  Eyes: Negative for pain, discharge and visual disturbance  Respiratory: Negative for cough, shortness of breath and wheezing  Cardiovascular: Negative for chest pain, palpitations and leg swelling  Gastrointestinal: Negative for abdominal distention, abdominal pain, blood in stool, diarrhea and nausea  Endocrine: Negative for polydipsia, polyphagia and polyuria  Genitourinary: Negative for dysuria, frequency, hematuria and urgency  Musculoskeletal: Negative for arthralgias, gait problem and joint swelling     Skin: Positive for rash  Negative for pallor  Neurological: Positive for dizziness  Negative for syncope, speech difficulty, weakness, light-headedness, numbness and headaches  Hematological: Negative for adenopathy  Psychiatric/Behavioral: Positive for dysphoric mood  Negative for behavioral problems, confusion and sleep disturbance  The patient is nervous/anxious  Past Medical History     Past Medical History:   Diagnosis Date    Allergic rhinitis     Last assessed - 14    Anxiety     Vertigo     Last assessed - 14       Past Surgical History     Past Surgical History:   Procedure Laterality Date    ABDOMINAL SURGERY      APPENDECTOMY      Childhood, Had an exploratory abd laparotomy at age 15 and had an appendectomy    HAND SURGERY Left     Finger as a child - thumb reconstruction surgery age 12, secondary to injury     TONSILLECTOMY         Social History     Social History     Socioeconomic History    Marital status: /Civil Union     Spouse name: None    Number of children: None    Years of education: None    Highest education level: None   Occupational History    Occupation: [employer]    Social Needs    Financial resource strain: None    Food insecurity     Worry: None     Inability: None    Transportation needs     Medical: None     Non-medical: None   Tobacco Use    Smoking status: Light Tobacco Smoker     Types: Cigarettes, Cigars     Last attempt to quit: 2016     Years since quittin 3    Smokeless tobacco: Never Used    Tobacco comment: Use of E-Cig per [employer]    Substance and Sexual Activity    Alcohol use: Yes     Alcohol/week: 2 0 standard drinks     Types: 2 Cans of beer per week     Frequency: 2-4 times a month     Drinks per session: 1 or 2     Comment:  Occ    Drug use: No    Sexual activity: None   Lifestyle    Physical activity     Days per week: None     Minutes per session: None    Stress: None   Relationships    Social connections     Talks on phone: None     Gets together: None     Attends [employer] witness service: None     Active member of club or organization: None     Attends meetings of clubs or organizations: None     Relationship status: None    Intimate partner violence     Fear of current or ex partner: None     Emotionally abused: None     Physically abused: None     Forced sexual activity: None   Other Topics Concern    None   Social History Narrative    Caffeine use - Drinks coffee    Dental care, regularly    Pets/Animals - Cat     Travel history - Denied           Family History     Family History   Problem Relation Age of Onset    Breast cancer Mother     Coronary artery disease Father     COPD Father     Hypertension Father         Essential HTN    Diabetes Family     Substance Abuse Neg Hx     Mental illness Neg Hx        Current Medications       Current Outpatient Medications:     atorvastatin (LIPITOR) 10 mg tablet, Take 10 mg by mouth daily , Disp: , Rfl:     Cholecalciferol 1 25 MG (09198 UT) TABS, Take 5,000 Units by mouth , Disp: , Rfl:     diazepam (Valium) 5 mg tablet, one tab po q 8 hours prn vertigo, Disp: , Rfl:     DULoxetine (CYMBALTA) 30 mg delayed release capsule, Take 60 mg by mouth daily , Disp: , Rfl:     Empagliflozin-Metformin HCl (SYNJARDY) 12 5-1000 MG TABS, 2 (two) times a day , Disp: , Rfl:     fenofibrate (TRICOR) 145 mg tablet, Take 145 mg by mouth daily , Disp: , Rfl:     glucose blood (ONETOUCH VERIO) test strip, by In Vitro route, Disp: , Rfl:     hydrOXYzine pamoate (VISTARIL) 50 mg capsule, Take 50 mg by mouth 3 (three) times a day as needed , Disp: , Rfl:     insulin glargine (Toujeo Max SoloStar) 300 units/mL CONCETRATED U-300 injection pen (2-unit dial), Inject 60 Units under the skin daily, Disp: , Rfl:     OLANZapine (ZyPREXA) 2 5 mg tablet, take 1 tablet by oral route every day at bedtime, Disp: , Rfl:     propranolol (INDERAL) 10 mg tablet, Take 10 mg by mouth 2 (two) times a day , Disp: , Rfl:     traMADol (ULTRAM) 50 mg tablet, as needed, Disp: , Rfl:     traZODone (DESYREL) 100 mg tablet, Take 100 mg by mouth daily at bedtime , Disp: , Rfl:     triamterene-hydrochlorothiazide (DYAZIDE) 37 5-25 mg per capsule, , Disp: , Rfl:     Empagliflozin-metFORMIN HCl (Synjardy) 5-1000 MG TABS, Take by mouth 2 (two) times a day, Disp: , Rfl:     tobramycin-dexamethasone (TOBRADEX) ophthalmic suspension, Administer 2 drops to both eyes 4 (four) times a day for 5 days, Disp: 2 5 mL, Rfl: 0     Allergies     Allergies   Allergen Reactions    Augmentin [Amoxicillin-Pot Clavulanate] Vomiting    Amoxicillin Vomiting    Clavulanic Acid Vomiting       Objective     /76 (BP Location: Left arm, Patient Position: Sitting, Cuff Size: Adult)   Pulse 88   Temp 97 7 °F (36 5 °C) (Temporal)   Resp 12   Ht 5' 11 5" (1 816 m)   Wt 90 7 kg (200 lb)   SpO2 95%   BMI 27 51 kg/m²      Physical Exam  Constitutional:       Appearance: He is well-developed  HENT:      Head: Normocephalic and atraumatic  Right Ear: External ear normal       Left Ear: External ear normal       Nose: Nose normal    Eyes:      General:         Right eye: No discharge  Left eye: No discharge  Conjunctiva/sclera: Conjunctivae normal       Pupils: Pupils are equal, round, and reactive to light  Neck:      Musculoskeletal: Neck supple  Thyroid: No thyromegaly  Vascular: No JVD  Cardiovascular:      Rate and Rhythm: Normal rate and regular rhythm  Heart sounds: Normal heart sounds  No murmur  Pulmonary:      Effort: Pulmonary effort is normal       Breath sounds: Normal breath sounds  No wheezing or rales  Abdominal:      General: Bowel sounds are normal       Palpations: Abdomen is soft  There is no mass  Tenderness: There is no abdominal tenderness  There is no guarding or rebound     Genitourinary:     Penis: Normal        Prostate: Normal       Rectum: Normal  Guaiac result negative  Musculoskeletal: Normal range of motion  General: No tenderness or deformity  Lymphadenopathy:      Cervical: No cervical adenopathy  Skin:     General: Skin is warm and dry  Findings: No erythema or rash  Comments: SEVERAL LESIONS? ACTINIC KERATOSIS ON R ARM  ? BCC R LOWER FACE   Neurological:      Mental Status: He is alert and oriented to person, place, and time  Cranial Nerves: No cranial nerve deficit  Motor: No abnormal muscle tone  Coordination: Coordination normal       Deep Tendon Reflexes: Reflexes are normal and symmetric  Psychiatric:         Behavior: Behavior normal          Thought Content: Thought content normal          Judgment: Judgment normal             Visual Acuity Screening    Right eye Left eye Both eyes   Without correction:      With correction: 20/20 20/20 20/15   Comments: Pt correctly identified the colors  sp/cma      Health Maintenance     Health Maintenance   Topic Date Due    Hepatitis C Screening  Never done    Pneumococcal Vaccine: Pediatrics (0 to 5 Years) and At-Risk Patients (6 to 59 Years) (1 of 1 - PPSV23) Never done    Annual Physical  Never done    DTaP,Tdap,and Td Vaccines (1 - Tdap) 01/06/1986    DM Eye Exam  07/17/2019    URINE MICROALBUMIN  02/26/2021    Influenza Vaccine (Season Ended) 09/01/2021    HEMOGLOBIN A1C  10/09/2021    Diabetic Foot Exam  01/18/2022    BMI: Followup Plan  03/24/2022    Depression Remission PHQ  03/24/2022    BMI: Adult  05/12/2022    Colorectal Cancer Screening  09/09/2026    HIV Screening  Completed    COVID-19 Vaccine  Completed    HIB Vaccine  Aged Out    Hepatitis B Vaccine  Aged Out    IPV Vaccine  Aged Out    Hepatitis A Vaccine  Aged Out    Meningococcal ACWY Vaccine  Aged Out    HPV Vaccine  Aged Out     Immunization History   Administered Date(s) Administered    Influenza Whole 12/19/2001    SARS-CoV-2 / COVID-19 mRNA IM (Pfizer-BioNTech) 03/17/2021, 04/08/2021    Td (adult), adsorbed 01/01/2002       Margaret Escobar MD  [facility]

## 2021-05-15 LAB
ALBUMIN SERPL-MCNC: 4.5 G/DL (ref 3.6–5.1)
ALBUMIN/CREAT UR: NORMAL MCG/MG CREAT
ALBUMIN/GLOB SERPL: 1.7 (CALC) (ref 1–2.5)
ALP SERPL-CCNC: 54 U/L (ref 35–144)
ALT SERPL-CCNC: 22 U/L (ref 9–46)
AST SERPL-CCNC: 16 U/L (ref 10–35)
BASOPHILS # BLD AUTO: 77 CELLS/UL (ref 0–200)
BASOPHILS NFR BLD AUTO: 1.2 %
BILIRUB SERPL-MCNC: 0.7 MG/DL (ref 0.2–1.2)
BUN SERPL-MCNC: 15 MG/DL (ref 7–25)
BUN/CREAT SERPL: ABNORMAL (CALC) (ref 6–22)
CALCIUM SERPL-MCNC: 9.6 MG/DL (ref 8.6–10.3)
CHLORIDE SERPL-SCNC: 99 MMOL/L (ref 98–110)
CHOLEST SERPL-MCNC: 183 MG/DL
CHOLEST/HDLC SERPL: 5.1 (CALC)
CO2 SERPL-SCNC: 31 MMOL/L (ref 20–32)
CREAT SERPL-MCNC: 0.75 MG/DL (ref 0.7–1.33)
CREAT UR-MCNC: 53 MG/DL (ref 20–320)
EOSINOPHIL # BLD AUTO: 192 CELLS/UL (ref 15–500)
EOSINOPHIL NFR BLD AUTO: 3 %
ERYTHROCYTE [DISTWIDTH] IN BLOOD BY AUTOMATED COUNT: 13 % (ref 11–15)
EST. AVERAGE GLUCOSE BLD GHB EST-MCNC: 154 (CALC)
EST. AVERAGE GLUCOSE BLD GHB EST-SCNC: 8.5 (CALC)
GLOBULIN SER CALC-MCNC: 2.6 G/DL (CALC) (ref 1.9–3.7)
GLUCOSE SERPL-MCNC: 140 MG/DL (ref 65–99)
HBA1C MFR BLD: 7 % OF TOTAL HGB
HCT VFR BLD AUTO: 46.8 % (ref 38.5–50)
HCV AB S/CO SERPL IA: 0.03
HCV AB SERPL QL IA: NORMAL
HDLC SERPL-MCNC: 36 MG/DL
HGB BLD-MCNC: 15.9 G/DL (ref 13.2–17.1)
HIV 1+2 AB+HIV1 P24 AG SERPL QL IA: NORMAL
LYMPHOCYTES # BLD AUTO: 2202 CELLS/UL (ref 850–3900)
LYMPHOCYTES NFR BLD AUTO: 34.4 %
MCH RBC QN AUTO: 30.1 PG (ref 27–33)
MCHC RBC AUTO-ENTMCNC: 34 G/DL (ref 32–36)
MCV RBC AUTO: 88.5 FL (ref 80–100)
MICROALBUMIN UR-MCNC: <0.2 MG/DL
MONOCYTES # BLD AUTO: 640 CELLS/UL (ref 200–950)
MONOCYTES NFR BLD AUTO: 10 %
NEUTROPHILS # BLD AUTO: 3290 CELLS/UL (ref 1500–7800)
NEUTROPHILS NFR BLD AUTO: 51.4 %
NONHDLC SERPL-MCNC: 147 MG/DL (CALC)
PLATELET # BLD AUTO: 243 THOUSAND/UL (ref 140–400)
PMV BLD REES-ECKER: 9.4 FL (ref 7.5–12.5)
POTASSIUM SERPL-SCNC: 4.2 MMOL/L (ref 3.5–5.3)
PROT SERPL-MCNC: 7.1 G/DL (ref 6.1–8.1)
PSA SERPL-MCNC: 0.4 NG/ML
RBC # BLD AUTO: 5.29 MILLION/UL (ref 4.2–5.8)
SL AMB EGFR AFRICAN AMERICAN: 119 ML/MIN/1.73M2
SL AMB EGFR NON AFRICAN AMERICAN: 103 ML/MIN/1.73M2
SODIUM SERPL-SCNC: 138 MMOL/L (ref 135–146)
TRIGL SERPL-MCNC: 419 MG/DL
TSH SERPL-ACNC: 1.58 MIU/L (ref 0.4–4.5)
WBC # BLD AUTO: 6.4 THOUSAND/UL (ref 3.8–10.8)

## 2021-05-15 PROCEDURE — 3051F HG A1C>EQUAL 7.0%<8.0%: CPT | Performed by: FAMILY MEDICINE

## 2021-06-16 ENCOUNTER — PATIENT OUTREACH (OUTPATIENT)
Dept: CASE MANAGEMENT | Facility: OTHER | Age: 56
End: 2021-06-16

## 2021-10-08 ENCOUNTER — PATIENT OUTREACH (OUTPATIENT)
Dept: CASE MANAGEMENT | Facility: OTHER | Age: 56
End: 2021-10-08

## 2021-12-01 ENCOUNTER — OFFICE VISIT (OUTPATIENT)
Dept: FAMILY MEDICINE CLINIC | Facility: CLINIC | Age: 56
End: 2021-12-01
Payer: COMMERCIAL

## 2021-12-01 VITALS
BODY MASS INDEX: 27.52 KG/M2 | SYSTOLIC BLOOD PRESSURE: 120 MMHG | HEART RATE: 91 BPM | WEIGHT: 203.2 LBS | HEIGHT: 72 IN | TEMPERATURE: 97.3 F | OXYGEN SATURATION: 97 % | RESPIRATION RATE: 16 BRPM | DIASTOLIC BLOOD PRESSURE: 78 MMHG

## 2021-12-01 DIAGNOSIS — H92.09 OTALGIA, UNSPECIFIED LATERALITY: ICD-10-CM

## 2021-12-01 DIAGNOSIS — J98.01 BRONCHOSPASM: ICD-10-CM

## 2021-12-01 DIAGNOSIS — J02.9 PHARYNGITIS, UNSPECIFIED ETIOLOGY: Primary | ICD-10-CM

## 2021-12-01 PROCEDURE — 3078F DIAST BP <80 MM HG: CPT | Performed by: FAMILY MEDICINE

## 2021-12-01 PROCEDURE — 3074F SYST BP LT 130 MM HG: CPT | Performed by: FAMILY MEDICINE

## 2021-12-01 PROCEDURE — 4004F PT TOBACCO SCREEN RCVD TLK: CPT | Performed by: FAMILY MEDICINE

## 2021-12-01 PROCEDURE — 99214 OFFICE O/P EST MOD 30 MIN: CPT | Performed by: FAMILY MEDICINE

## 2021-12-01 PROCEDURE — 3008F BODY MASS INDEX DOCD: CPT | Performed by: FAMILY MEDICINE

## 2021-12-01 RX ORDER — DOXYCYCLINE HYCLATE 100 MG
100 TABLET ORAL 2 TIMES DAILY
Qty: 28 TABLET | Refills: 0 | Status: SHIPPED | OUTPATIENT
Start: 2021-12-01 | End: 2021-12-15

## 2021-12-01 RX ORDER — PREDNISONE 20 MG/1
40 TABLET ORAL DAILY
Qty: 8 TABLET | Refills: 0 | Status: SHIPPED | OUTPATIENT
Start: 2021-12-01 | End: 2021-12-05

## 2021-12-30 ENCOUNTER — TELEPHONE (OUTPATIENT)
Dept: ADMINISTRATIVE | Facility: HOSPITAL | Age: 56
End: 2021-12-30

## 2022-06-21 ENCOUNTER — OFFICE VISIT (OUTPATIENT)
Dept: FAMILY MEDICINE CLINIC | Facility: CLINIC | Age: 57
End: 2022-06-21
Payer: COMMERCIAL

## 2022-06-21 VITALS
WEIGHT: 207 LBS | BODY MASS INDEX: 28.04 KG/M2 | RESPIRATION RATE: 16 BRPM | HEART RATE: 86 BPM | HEIGHT: 72 IN | OXYGEN SATURATION: 98 % | DIASTOLIC BLOOD PRESSURE: 80 MMHG | SYSTOLIC BLOOD PRESSURE: 122 MMHG | TEMPERATURE: 97.3 F

## 2022-06-21 DIAGNOSIS — H01.004 BLEPHARITIS OF UPPER EYELIDS OF BOTH EYES, UNSPECIFIED TYPE: Primary | ICD-10-CM

## 2022-06-21 DIAGNOSIS — H01.001 BLEPHARITIS OF UPPER EYELIDS OF BOTH EYES, UNSPECIFIED TYPE: Primary | ICD-10-CM

## 2022-06-21 PROCEDURE — 99213 OFFICE O/P EST LOW 20 MIN: CPT | Performed by: NURSE PRACTITIONER

## 2022-06-21 RX ORDER — KETOCONAZOLE 20 MG/ML
SHAMPOO TOPICAL
COMMUNITY
Start: 2022-02-21

## 2022-06-21 RX ORDER — DULOXETIN HYDROCHLORIDE 60 MG/1
60 CAPSULE, DELAYED RELEASE ORAL DAILY
COMMUNITY
Start: 2022-05-21

## 2022-06-21 RX ORDER — AZITHROMYCIN 250 MG/1
TABLET, FILM COATED ORAL
Qty: 6 TABLET | Refills: 0 | Status: SHIPPED | OUTPATIENT
Start: 2022-06-21 | End: 2022-06-25

## 2022-06-21 RX ORDER — TOBRAMYCIN AND DEXAMETHASONE 3; 1 MG/ML; MG/ML
1 SUSPENSION/ DROPS OPHTHALMIC
Qty: 5 ML | Refills: 0 | Status: SHIPPED | OUTPATIENT
Start: 2022-06-21

## 2022-06-24 ENCOUNTER — OFFICE VISIT (OUTPATIENT)
Dept: FAMILY MEDICINE CLINIC | Facility: CLINIC | Age: 57
End: 2022-06-24
Payer: COMMERCIAL

## 2022-06-24 VITALS
TEMPERATURE: 97.1 F | BODY MASS INDEX: 27.63 KG/M2 | HEART RATE: 97 BPM | DIASTOLIC BLOOD PRESSURE: 70 MMHG | WEIGHT: 204 LBS | HEIGHT: 72 IN | RESPIRATION RATE: 12 BRPM | OXYGEN SATURATION: 97 % | SYSTOLIC BLOOD PRESSURE: 110 MMHG

## 2022-06-24 DIAGNOSIS — H02.89 PAIN AND SWELLING OF EYELID: ICD-10-CM

## 2022-06-24 DIAGNOSIS — H02.849 PAIN AND SWELLING OF EYELID: ICD-10-CM

## 2022-06-24 DIAGNOSIS — H10.32 ACUTE BACTERIAL CONJUNCTIVITIS OF LEFT EYE: ICD-10-CM

## 2022-06-24 DIAGNOSIS — H01.001 BLEPHARITIS OF UPPER EYELIDS OF BOTH EYES, UNSPECIFIED TYPE: Primary | ICD-10-CM

## 2022-06-24 DIAGNOSIS — H01.004 BLEPHARITIS OF UPPER EYELIDS OF BOTH EYES, UNSPECIFIED TYPE: Primary | ICD-10-CM

## 2022-06-24 PROCEDURE — 4004F PT TOBACCO SCREEN RCVD TLK: CPT | Performed by: NURSE PRACTITIONER

## 2022-06-24 PROCEDURE — 96372 THER/PROPH/DIAG INJ SC/IM: CPT | Performed by: NURSE PRACTITIONER

## 2022-06-24 PROCEDURE — 99214 OFFICE O/P EST MOD 30 MIN: CPT | Performed by: NURSE PRACTITIONER

## 2022-06-24 PROCEDURE — 3008F BODY MASS INDEX DOCD: CPT | Performed by: NURSE PRACTITIONER

## 2022-06-24 RX ORDER — DEXAMETHASONE SODIUM PHOSPHATE 4 MG/ML
4 INJECTION, SOLUTION INTRA-ARTICULAR; INTRALESIONAL; INTRAMUSCULAR; INTRAVENOUS; SOFT TISSUE ONCE
Status: COMPLETED | OUTPATIENT
Start: 2022-06-24 | End: 2022-06-24

## 2022-06-24 RX ORDER — PREDNISONE 20 MG/1
TABLET ORAL
Qty: 11 TABLET | Refills: 0 | Status: SHIPPED | OUTPATIENT
Start: 2022-06-24

## 2022-06-24 RX ADMIN — DEXAMETHASONE SODIUM PHOSPHATE 4 MG: 4 INJECTION, SOLUTION INTRA-ARTICULAR; INTRALESIONAL; INTRAMUSCULAR; INTRAVENOUS; SOFT TISSUE at 15:47

## 2022-06-24 NOTE — PROGRESS NOTES
Assessment/Plan:    1  Blepharitis of upper eyelids of both eyes, unspecified type  -     dexamethasone (DECADRON) injection 4 mg  -     predniSONE 20 mg tablet; Take 2 tablets PO daily x's 3 days, then take 1 tablet daily x's 3 days, then take 1/2 tablet daily x's 3 days    2  Acute bacterial conjunctivitis of left eye  -     dexamethasone (DECADRON) injection 4 mg  -     predniSONE 20 mg tablet; Take 2 tablets PO daily x's 3 days, then take 1 tablet daily x's 3 days, then take 1/2 tablet daily x's 3 days    3  Pain and swelling of eyelid  -     dexamethasone (DECADRON) injection 4 mg  -     predniSONE 20 mg tablet; Take 2 tablets PO daily x's 3 days, then take 1 tablet daily x's 3 days, then take 1/2 tablet daily x's 3 days        patient advised to go the ED if symptoms worsen overnight  Subjective:      Patient ID: Jeremy Miller is a 62 y o  male  Chief Complaint   Patient presents with    Eye Problem     Pt here for swollen eye  Was here on Tuesday, started last week  Eye Problem   Both eyes are affected  This is a recurrent problem  The current episode started in the past 7 days  The problem occurs constantly  The problem has been gradually improving  There was no injury mechanism  The patient is experiencing no pain  Associated symptoms include blurred vision, eye redness, itching and photophobia  Pertinent negatives include no fever  He has tried eye drops for the symptoms  The treatment provided mild relief  The following portions of the patient's history were reviewed and updated as appropriate: allergies, current medications, past family history, past medical history, past social history, past surgical history and problem list     Review of Systems   Constitutional: Negative for chills and fever  Eyes: Positive for blurred vision, photophobia, redness and itching  Negative for pain           Current Outpatient Medications   Medication Sig Dispense Refill    atorvastatin (LIPITOR) 10 mg tablet Take 10 mg by mouth daily       azithromycin (ZITHROMAX) 250 mg tablet Take 2 tablets PO on day one, then take 1 tablet PO daily x's 4 days 6 tablet 0    Cholecalciferol 1 25 MG (89803 UT) TABS Take 5,000 Units by mouth       diazepam (VALIUM) 5 mg tablet one tab po q 8 hours prn vertigo      DULoxetine (CYMBALTA) 30 mg delayed release capsule Take 60 mg by mouth daily       DULoxetine (CYMBALTA) 60 mg delayed release capsule Take 60 mg by mouth daily Morning      Empagliflozin-metFORMIN HCl 12 5-1000 MG TABS 2 (two) times a day       fenofibrate (TRICOR) 145 mg tablet Take 145 mg by mouth daily       glucose blood test strip by In Vitro route      hydrOXYzine pamoate (VISTARIL) 50 mg capsule Take 50 mg by mouth 3 (three) times a day as needed       insulin glargine (TOUJEO MAX) 300 units/mL CONCENTRATED U-300 injection pen (2-unit dial) Inject 60 Units under the skin daily      ketoconazole (NIZORAL) 2 % shampoo       OLANZapine (ZyPREXA) 2 5 mg tablet take 1 tablet by oral route every day at bedtime      predniSONE 20 mg tablet Take 2 tablets PO daily x's 3 days, then take 1 tablet daily x's 3 days, then take 1/2 tablet daily x's 3 days 11 tablet 0    propranolol (INDERAL) 10 mg tablet Take 10 mg by mouth 2 (two) times a day       tobramycin-dexamethasone (TOBRADEX) ophthalmic suspension Administer 1 drop to both eyes every 4 (four) hours while awake 5 mL 0    traMADol (ULTRAM) 50 mg tablet 50 mg        traZODone (DESYREL) 100 mg tablet Take 100 mg by mouth daily at bedtime       triamterene-hydrochlorothiazide (DYAZIDE) 37 5-25 mg per capsule        No current facility-administered medications for this visit  Objective:    /70   Pulse 97   Temp (!) 97 1 °F (36 2 °C) (Temporal)   Resp 12   Ht 5' 11 5" (1 816 m)   Wt 92 5 kg (204 lb)   SpO2 97%   BMI 28 06 kg/m²        Physical Exam  Vitals reviewed  Constitutional:       Appearance: Normal appearance     HENT: Head: Normocephalic and atraumatic  Eyes:      Extraocular Movements: Extraocular movements intact  Conjunctiva/sclera:      Right eye: Chemosis present  Left eye: Chemosis present  Comments: Right eyelid upper and lower have mild erythema and swelling  Left upper eyelid has significant swelling and edema, tenderness with palpation   Neurological:      Mental Status: He is alert and oriented to person, place, and time     Psychiatric:         Mood and Affect: Mood normal                 RODOLFO Mcconnell

## 2022-10-12 PROBLEM — Z13.29 SCREENING FOR HYPOTHYROIDISM: Status: RESOLVED | Noted: 2021-05-12 | Resolved: 2022-10-12

## 2022-10-12 PROBLEM — Z00.00 ROUTINE GENERAL MEDICAL EXAMINATION AT A HEALTH CARE FACILITY: Status: RESOLVED | Noted: 2021-05-12 | Resolved: 2022-10-12

## 2022-10-12 PROBLEM — Z12.11 COLON CANCER SCREENING: Status: RESOLVED | Noted: 2021-05-12 | Resolved: 2022-10-12

## 2022-10-12 PROBLEM — Z12.5 ENCOUNTER FOR PROSTATE CANCER SCREENING: Status: RESOLVED | Noted: 2021-05-12 | Resolved: 2022-10-12

## 2023-05-30 LAB — HBA1C MFR BLD HPLC: 6.8 %

## 2023-10-12 ENCOUNTER — NURSE TRIAGE (OUTPATIENT)
Age: 58
End: 2023-10-12

## 2023-10-12 NOTE — TELEPHONE ENCOUNTER
Reason for Disposition   Nausea is a chronic symptom (recurrent or ongoing AND present > 4 weeks)    Answer Assessment - Initial Assessment Questions  1. NAUSEA SEVERITY: "How bad is the nausea?" (e.g., mild, moderate, severe; dehydration, weight loss)    - MILD: loss of appetite without change in eating habits    - MODERATE: decreased oral intake without significant weight loss, dehydration, or malnutrition    - SEVERE: inadequate caloric or fluid intake, significant weight loss, symptoms of dehydration      severe  2. ONSET: "When did the nausea begin?"      2 months ago  3. VOMITING: "Any vomiting?" If Yes, ask: "How many times today?"      Yes intermittent   4. RECURRENT SYMPTOM: "Have you had nausea before?" If Yes, ask: "When was the last time?" "What happened that time?"      Intermittent x 2 months  5. CAUSE: "What do you think is causing the nausea?"      unknown    Protocols used: Nausea-ADULT-OH    Intermittent nausea , epigastric discomfort and sometimes vomiting for 2 months  . Patient requested appt 10/18. No available appts that day, scheduled 10/24.

## 2023-10-20 ENCOUNTER — TELEPHONE (OUTPATIENT)
Age: 58
End: 2023-10-20

## 2023-10-20 NOTE — TELEPHONE ENCOUNTER
Paul Kimball has an apt with Dr. Jordan Hardy on Tuesday. He is keeping this apt. He went to [facility] twice since he made apt for his stomach.   He will be bringing in paperwork from hospital.     Just WATSON

## 2023-10-26 ENCOUNTER — TELEPHONE (OUTPATIENT)
Age: 58
End: 2023-10-26

## 2023-10-26 NOTE — TELEPHONE ENCOUNTER
Pt wife called in with a lot of concerns, within the last week the pt has been in and out of the ER room a total of 4 times. Yesterday he was admitted in to the  hospital, but they keep saying nothing is wrong with the pt. Pt has been throwing up a lot and has a lot of stomach pain. They are keeping the pt on a liquid diet, and are going to do another gallbladder testing on him. But pt wife said they basically told her that they can't find anything wrong with him and recommend going somewhere to get him checked out. Pt wife wants to ask Dr. Letty Tesfaye for advice on what they should be doing, please advise. Pt is currently still admitted in the ER.

## 2023-10-27 NOTE — TELEPHONE ENCOUNTER
UNFORTUNATELY   I THINK I NEED TO SEE HIM TO REVIEW THING AND IT WONT BE FOR A COUPLE OF WEEKS    HE MAY NEED TO GET A SECOND GI OPINION FROM A DIFFERENT FACILITY

## 2023-11-01 ENCOUNTER — TRANSITIONAL CARE MANAGEMENT (OUTPATIENT)
Dept: FAMILY MEDICINE CLINIC | Facility: CLINIC | Age: 58
End: 2023-11-01

## 2023-11-01 ENCOUNTER — TELEPHONE (OUTPATIENT)
Age: 58
End: 2023-11-01

## 2023-11-01 ENCOUNTER — TELEPHONE (OUTPATIENT)
Dept: FAMILY MEDICINE CLINIC | Facility: CLINIC | Age: 58
End: 2023-11-01

## 2023-11-01 ENCOUNTER — RA CDI HCC (OUTPATIENT)
Dept: OTHER | Facility: HOSPITAL | Age: 58
End: 2023-11-01

## 2023-11-01 NOTE — TELEPHONE ENCOUNTER
Pt called in stating he was discharged from the hospital for stomach issues. Warm transferred him to Nessa Ortiz in the office.

## 2023-11-01 NOTE — PROGRESS NOTES
[address] [facility] coding opportunities       Chart reviewed, no opportunity found: CHART REVIEWED, NO OPPORTUNITY FOUND        Patients Insurance        Commercial Insurance: [address]

## 2023-11-01 NOTE — TELEPHONE ENCOUNTER
Scheduled Virtual BUSTER with Dr Bernabe Blount 11/8/23. Admitted in [facility] 10/22-10/31.   11/1 called for records. Please call to get info on hospital stay.   Thanks

## 2023-11-01 NOTE — TELEPHONE ENCOUNTER
Spoke to patient. TCM completed. Day Babin [facility]  Practice Administrator  [address]

## 2023-11-08 ENCOUNTER — OFFICE VISIT (OUTPATIENT)
Dept: FAMILY MEDICINE CLINIC | Facility: CLINIC | Age: 58
End: 2023-11-08
Payer: COMMERCIAL

## 2023-11-08 VITALS
DIASTOLIC BLOOD PRESSURE: 68 MMHG | WEIGHT: 183 LBS | HEIGHT: 72 IN | BODY MASS INDEX: 24.79 KG/M2 | SYSTOLIC BLOOD PRESSURE: 128 MMHG | RESPIRATION RATE: 12 BRPM | TEMPERATURE: 97 F | OXYGEN SATURATION: 97 % | HEART RATE: 88 BPM

## 2023-11-08 DIAGNOSIS — Z76.89 ENCOUNTER FOR SUPPORT AND COORDINATION OF TRANSITION OF CARE: Primary | ICD-10-CM

## 2023-11-08 DIAGNOSIS — M48.02 SPINAL STENOSIS IN CERVICAL REGION: ICD-10-CM

## 2023-11-08 DIAGNOSIS — G43.909 MIGRAINE WITHOUT STATUS MIGRAINOSUS, NOT INTRACTABLE, UNSPECIFIED MIGRAINE TYPE: ICD-10-CM

## 2023-11-08 DIAGNOSIS — I10 ESSENTIAL HYPERTENSION: ICD-10-CM

## 2023-11-08 DIAGNOSIS — E78.2 MIXED HYPERLIPIDEMIA: ICD-10-CM

## 2023-11-08 DIAGNOSIS — Z23 ENCOUNTER FOR IMMUNIZATION: ICD-10-CM

## 2023-11-08 DIAGNOSIS — K29.01 OTHER ACUTE GASTRITIS WITH HEMORRHAGE: ICD-10-CM

## 2023-11-08 DIAGNOSIS — E11.42 TYPE 2 DIABETES MELLITUS WITH DIABETIC POLYNEUROPATHY, WITHOUT LONG-TERM CURRENT USE OF INSULIN (HCC): ICD-10-CM

## 2023-11-08 PROBLEM — R31.9 HEMATURIA SYNDROME: Status: ACTIVE | Noted: 2023-11-08

## 2023-11-08 PROBLEM — J02.9 PHARYNGITIS: Status: RESOLVED | Noted: 2021-12-01 | Resolved: 2023-11-08

## 2023-11-08 PROBLEM — J98.01 BRONCHOSPASM: Status: RESOLVED | Noted: 2021-12-01 | Resolved: 2023-11-08

## 2023-11-08 PROBLEM — H92.09 OTALGIA: Status: RESOLVED | Noted: 2021-12-01 | Resolved: 2023-11-08

## 2023-11-08 PROCEDURE — 99495 TRANSJ CARE MGMT MOD F2F 14D: CPT | Performed by: FAMILY MEDICINE

## 2023-11-08 PROCEDURE — 90686 IIV4 VACC NO PRSV 0.5 ML IM: CPT

## 2023-11-08 PROCEDURE — 90471 IMMUNIZATION ADMIN: CPT

## 2023-11-08 RX ORDER — DICYCLOMINE HYDROCHLORIDE 10 MG/1
CAPSULE ORAL
COMMUNITY
Start: 2023-11-01

## 2023-11-08 RX ORDER — AMOXICILLIN 500 MG/1
CAPSULE ORAL
COMMUNITY
End: 2023-11-08 | Stop reason: HOSPADM

## 2023-11-08 RX ORDER — GABAPENTIN 300 MG/1
CAPSULE ORAL
COMMUNITY
Start: 2023-08-04 | End: 2023-11-08 | Stop reason: HOSPADM

## 2023-11-08 RX ORDER — BLOOD-GLUCOSE SENSOR
EACH MISCELLANEOUS
COMMUNITY
Start: 2023-11-05 | End: 2023-11-08

## 2023-11-08 RX ORDER — ERENUMAB-AOOE 70 MG/ML
INJECTION SUBCUTANEOUS
COMMUNITY
End: 2023-11-08 | Stop reason: HOSPADM

## 2023-11-08 RX ORDER — OLANZAPINE 5 MG/1
TABLET ORAL
COMMUNITY
Start: 2023-11-05

## 2023-11-08 RX ORDER — AMLODIPINE BESYLATE 2.5 MG/1
1 TABLET ORAL DAILY
COMMUNITY
Start: 2023-11-01 | End: 2023-11-08 | Stop reason: HOSPADM

## 2023-11-08 RX ORDER — CANDESARTAN 4 MG/1
1 TABLET ORAL DAILY
COMMUNITY
Start: 2023-11-01

## 2023-11-08 RX ORDER — TADALAFIL 20 MG/1
TABLET ORAL
COMMUNITY
Start: 2023-11-05

## 2023-11-08 RX ORDER — PANTOPRAZOLE SODIUM 40 MG
1 TABLET, DELAYED RELEASE (ENTERIC COATED) ORAL 2 TIMES DAILY
COMMUNITY

## 2023-11-08 NOTE — PATIENT INSTRUCTIONS
CONTINUE CURRENT TREATMENT PLAN  AVOID NSAID, CAFFEINE, ALCOHOL AND TOBACCO  MONITOR SYMPTOMS    GI FOLLOW UP    CALL IF SYMPTOMS PERSIST OR WORSEN

## 2023-11-08 NOTE — PROGRESS NOTES
Name: Ewa Porter      : 1965      MRN: [id number]  Encounter Provider: Royer Gonzalez MD  Encounter Date: 2023   Encounter department: [facility]     1. Encounter for support and coordination of transition of care    2. Other acute gastritis with hemorrhage    3. Type 2 diabetes mellitus with diabetic polyneuropathy, without long-term current use of insulin ([address])    4. Essential hypertension    5. Mixed hyperlipidemia    6. Spinal stenosis in cervical region    7. Migraine without status migrainosus, not intractable, unspecified migraine type    8. Encounter for immunization  -     influenza vaccine, quadrivalent, 0.5 mL, preservative-free, for adult and pediatric patients 6 mos+ (AFLURIA, FLUARIX, FLULAVAL, FLUZONE)      BMI Counseling: Body mass index is 25.17 kg/m². The BMI is above normal. Nutrition recommendations include encouraging healthy choices of fruits and vegetables and moderation in carbohydrate intake. Exercise recommendations include exercising 3-5 times per week. No pharmacotherapy was ordered. Rationale for BMI follow-up plan is due to patient being overweight or obese. Tobacco Cessation Counseling: Tobacco cessation counseling was provided. The patient is sincerely urged to quit consumption of tobacco. He is ready to quit tobacco. Medication options discussed. Side effects of medication not discussed. Subjective      TRANSITION OF CARE      PATIENT IS S/P [facility] ADMISSION FOR ACUTE GASTRITIS AND HEMATEMESIS    DATE OF DISCHARGE 10/31/23    REVIEWED DISCHARGE SUMMARY, INSTRUCTIONS AND MEDICATIONS  REVIEWED HOSPITAL COURSE    PATIENT FEELS MUCH BETTER  DENIES ANY MELENA OR HEMATEMESIS   NO NV  STILL SOME MILD EPIGASTRIC PAIN  NO FEVER OR CHILLS        Review of Systems   Constitutional:  Negative for chills, fatigue and fever.    HENT:  Negative for congestion, ear discharge, ear pain, mouth sores, postnasal drip, sore throat and trouble swallowing. Eyes:  Negative for pain, discharge and visual disturbance. Respiratory:  Negative for cough, shortness of breath and wheezing. Cardiovascular:  Negative for chest pain, palpitations and leg swelling. Gastrointestinal:  Positive for abdominal pain. Negative for abdominal distention, blood in stool, diarrhea, nausea and vomiting. Endocrine: Negative for polydipsia, polyphagia and polyuria. Genitourinary:  Negative for dysuria, frequency, hematuria and urgency. Musculoskeletal:  Negative for arthralgias, gait problem and joint swelling. Skin:  Negative for pallor and rash. Neurological:  Negative for dizziness, syncope, speech difficulty, weakness, light-headedness, numbness and headaches. Hematological:  Negative for adenopathy. Psychiatric/Behavioral:  Negative for behavioral problems, confusion and sleep disturbance. The patient is not nervous/anxious.         Current Outpatient Medications on File Prior to Visit   Medication Sig   • atorvastatin (LIPITOR) 10 mg tablet Take 10 mg by mouth daily    • candesartan (Atacand) 4 mg tablet Take 1 tablet by mouth daily   • Cholecalciferol 1.25 MG (81288 UT) TABS Take 5,000 Units by mouth    • diazepam (VALIUM) 5 mg tablet one tab po q 8 hours prn vertigo   • dicyclomine (BENTYL) 10 mg capsule 10 mg cap by mouth three (3) times per day   • DULoxetine (CYMBALTA) 60 mg delayed release capsule Take 60 mg by mouth 2 (two) times a day Morning   • Empagliflozin-metFORMIN HCl 12.5-1000 MG TABS 2 (two) times a day    • fenofibrate (TRICOR) 145 mg tablet Take 145 mg by mouth daily    • glucose blood test strip by In Vitro route   • insulin glargine (TOUJEO MAX) 300 units/mL CONCENTRATED U-300 injection pen (2-unit dial) Inject 60 Units under the skin daily   • ketoconazole (NIZORAL) 2 % shampoo    • OLANZapine (ZyPREXA) 5 mg tablet    • propranolol (INDERAL) 10 mg tablet Take 10 mg by mouth 2 (two) times a day    • Protonix 40 MG tablet Take 1 tablet by mouth 2 (two) times a day   • tadalafil (CIALIS) 20 MG tablet    • traZODone (DESYREL) 100 mg tablet Take 150 mg by mouth daily at bedtime   • triamterene-hydrochlorothiazide (DYAZIDE) 37.5-25 mg per capsule    • [DISCONTINUED] gabapentin (NEURONTIN) 300 mg capsule    • [DISCONTINUED] OLANZapine (ZyPREXA) 2.5 mg tablet take 1 tablet by oral route every day at bedtime   • [DISCONTINUED] traMADol (ULTRAM) 50 mg tablet 50 mg     • [DISCONTINUED] amLODIPine (Norvasc) 2.5 mg tablet Take 1 tablet by mouth daily (Patient not taking: Reported on 11/8/2023)   • [DISCONTINUED] amoxicillin (AMOXIL) 500 mg capsule    • [DISCONTINUED] Continuous Blood Gluc Sensor (FreeStyle Kady 3 Sensor) MISC  (Patient not taking: Reported on 11/8/2023)   • [DISCONTINUED] DULoxetine (CYMBALTA) 30 mg delayed release capsule Take 60 mg by mouth daily    • [DISCONTINUED] Erenumab-aooe (Aimovig) 70 MG/ML SOAJ Inject 1 mL every month by subcutaneous route for 30 days. • [DISCONTINUED] hydrOXYzine pamoate (VISTARIL) 50 mg capsule Take 50 mg by mouth 3 (three) times a day as needed    • [DISCONTINUED] predniSONE 20 mg tablet Take 2 tablets PO daily x's 3 days, then take 1 tablet daily x's 3 days, then take 1/2 tablet daily x's 3 days   • [DISCONTINUED] tobramycin-dexamethasone (TOBRADEX) ophthalmic suspension Administer 1 drop to both eyes every 4 (four) hours while awake       Objective     /68 (BP Location: Right arm, Patient Position: Sitting, Cuff Size: Large)   Pulse 88   Temp (!) 97 °F (36.1 °C) (Temporal)   Resp 12   Ht 5' 11.5" (1.816 m)   Wt 83 kg (183 lb)   SpO2 97%   BMI 25.17 kg/m²     Physical Exam  Vitals reviewed. Constitutional:       General: He is not in acute distress. Appearance: Normal appearance. He is well-developed and normal weight. He is not ill-appearing. HENT:      Head: Normocephalic and atraumatic. Eyes:      General:         Right eye: No discharge.          Left eye: No discharge. Conjunctiva/sclera: Conjunctivae normal.      Pupils: Pupils are equal, round, and reactive to light. Neck:      Thyroid: No thyromegaly. Vascular: No JVD. Cardiovascular:      Rate and Rhythm: Normal rate and regular rhythm. Pulses: no weak pulses          Dorsalis pedis pulses are 1+ on the right side and 1+ on the left side. Posterior tibial pulses are 1+ on the right side and 1+ on the left side. Heart sounds: Normal heart sounds. No murmur heard. Pulmonary:      Effort: Pulmonary effort is normal.      Breath sounds: No wheezing or rales. Comments: COARSE BS  OCC EXP WHEEZE    Abdominal:      General: Bowel sounds are normal.      Palpations: Abdomen is soft. There is no mass. Tenderness: There is abdominal tenderness. There is no guarding or rebound. Comments: MILD EPIGASTRIC TENDERNESS   Musculoskeletal:         General: No tenderness or deformity. Normal range of motion. Cervical back: Neck supple. Feet:      Right foot:      Skin integrity: No ulcer, skin breakdown, erythema, warmth, callus or dry skin. Left foot:      Skin integrity: No ulcer, skin breakdown, erythema, warmth, callus or dry skin. Lymphadenopathy:      Cervical: No cervical adenopathy. Skin:     General: Skin is warm and dry. Findings: No erythema or rash. Neurological:      General: No focal deficit present. Mental Status: He is alert and oriented to person, place, and time. Psychiatric:         Mood and Affect: Mood normal.         Behavior: Behavior normal.         Thought Content: Thought content normal.         Judgment: Judgment normal.     Diabetic Foot Exam    Patient's shoes and socks removed. Right Foot/Ankle   Right Foot Inspection  Skin Exam: skin normal and skin intact. No dry skin, no warmth, no callus, no erythema, no maceration, no abnormal color, no pre-ulcer, no ulcer and no callus.      Toe Exam: ROM and strength within normal limits. Sensory   Monofilament testing: intact    Vascular  The right DP pulse is 1+. The right PT pulse is 1+. Left Foot/Ankle  Left Foot Inspection  Skin Exam: skin normal and skin intact. No dry skin, no warmth, no erythema, no maceration, normal color, no pre-ulcer, no ulcer and no callus. Toe Exam: ROM and strength within normal limits. Sensory   Monofilament testing: intact    Vascular  The left DP pulse is 1+. The left PT pulse is 1+. Assign Risk Category  No deformity present  No loss of protective sensation  No weak pulses  Risk: 0    TCM Call     Date and time call was made  11/1/2023  3:03 PM    Hospital care reviewed  Records reviewed    Patient was hospitialized at  Other (comment)    [address] [facility]    Date of Admission  10/22/23    Date of discharge  10/31/23    Diagnosis  Vomiting; abdominal pain    Disposition  Home    Were the patients medications reviewed and updated  Yes    Current Symptoms  Lower abdominal pain  Cotinuing abdominal pain    Lower abdominal pain severity  Mild    Lower abdominal pain onset  Ongoing      TCM Call     Post hospital issues  None    Should patient be enrolled in anticoag monitoring? No    Scheduled for follow up?   Yes    Patients specialists  Other (comment)    Other specialists names  Maribell Martins    Did you obtain your prescribed medications  Yes    Do you need help managing your prescriptions or medications  No    Is transportation to your appointment needed  No    I have advised the patient to call PCP with any new or worsening symptoms  JAVED Haley    Living Arrangements  Family members    Support System  Family    The type of support provided  None    Do you have social support  Yes, as much as I need    Are you recieving any outpatient services  No    Are you recieving home care services  No    Are you using any community resources  No    Current waiver services  No    Have you fallen in the last 12 months  No    Interperter language line needed  No    Counseling  Patient    Counseling topics  Importance of RX compliance            Gera Vazquez MD

## 2023-12-22 DIAGNOSIS — U07.1 COVID-19: Primary | ICD-10-CM

## 2023-12-22 RX ORDER — NIRMATRELVIR AND RITONAVIR 300-100 MG
3 KIT ORAL 2 TIMES DAILY
Qty: 30 TABLET | Refills: 0 | Status: SHIPPED | OUTPATIENT
Start: 2023-12-22 | End: 2023-12-27

## 2025-01-23 ENCOUNTER — TELEPHONE (OUTPATIENT)
Age: 60
End: 2025-01-23

## 2025-01-23 NOTE — TELEPHONE ENCOUNTER
PT scheduled with Dr Michele 1/31, which was the closest availabilty. PT is inquiring if Dr Saldana or Dr Allen could please fit him in the schedule today or early tomorrow. PT is experiencing really bad anxiety and panic attacks. His psych told him that his serotonin might be out of whack, and recommended he sees his PCP ASAP. PT also wants to talk about going on xanax.    PT requesting a phone call to advise ASAP.    ThankYou

## 2025-01-23 NOTE — TELEPHONE ENCOUNTER
Called Randy and spoke to Randy.  I was going to inform him that neither of the providers has anything opened, but will call him if someone cancels.  FREDI is here late tonight.  Want to advise him also to call first thing at 7:30 am to try to get a same day appt.    He stated he was in [facility] ER.

## 2025-03-04 ENCOUNTER — TELEPHONE (OUTPATIENT)
Age: 60
End: 2025-03-04

## 2025-03-04 NOTE — TELEPHONE ENCOUNTER
Patient called in regards to medication.  Randy will be leaving on a cruise 3/21 and has discussed with PCP about motion sickness medication. He wanted to confirm if that is something that can be ordered for him or does an appointment have to be scheduled.  If it can be ordered please send to the [facility] pharmacy.    Please advise and contact patient   Thank you

## 2025-03-06 NOTE — TELEPHONE ENCOUNTER
Pt called to follow-up on his Rx request for the motion sickness patches.  If OK to send, he uses the [facility] on [address] in [place].  If he needs an appt, please call to schedule as he leaves on his cruise in two weeks.  Thank you!

## 2025-03-07 DIAGNOSIS — T75.3XXD MOTION SICKNESS, SUBSEQUENT ENCOUNTER: Primary | ICD-10-CM

## 2025-03-07 RX ORDER — SCOPOLAMINE 1 MG/3D
1 PATCH, EXTENDED RELEASE TRANSDERMAL
Qty: 10 PATCH | Refills: 1 | Status: SHIPPED | OUTPATIENT
Start: 2025-03-07

## 2025-03-07 RX ORDER — PRAZOSIN HYDROCHLORIDE 1 MG/1
CAPSULE ORAL
COMMUNITY
Start: 2025-03-06

## 2025-03-07 RX ORDER — ALPRAZOLAM 0.5 MG
0.5 TABLET ORAL 2 TIMES DAILY PRN
COMMUNITY
Start: 2025-01-23

## 2025-03-07 RX ORDER — FLUOXETINE HYDROCHLORIDE 40 MG/1
CAPSULE ORAL
COMMUNITY
Start: 2025-01-21

## 2025-03-07 RX ORDER — BUPROPION HYDROCHLORIDE 100 MG/1
1 TABLET, EXTENDED RELEASE ORAL 2 TIMES DAILY
COMMUNITY
Start: 2025-02-27